# Patient Record
Sex: FEMALE | Race: WHITE | NOT HISPANIC OR LATINO | Employment: FULL TIME | URBAN - NONMETROPOLITAN AREA
[De-identification: names, ages, dates, MRNs, and addresses within clinical notes are randomized per-mention and may not be internally consistent; named-entity substitution may affect disease eponyms.]

---

## 2017-01-13 ENCOUNTER — DOCUMENTATION (OUTPATIENT)
Dept: OBSTETRICS AND GYNECOLOGY | Facility: CLINIC | Age: 37
End: 2017-01-13

## 2017-01-13 NOTE — PROGRESS NOTES
01/13/17  Oneil Silva's office requesting last pap et po notes on hysterectomy. Faxed to [phone].

## 2019-01-17 ENCOUNTER — TRANSCRIBE ORDERS (OUTPATIENT)
Dept: ADMINISTRATIVE | Facility: HOSPITAL | Age: 39
End: 2019-01-17

## 2019-01-17 DIAGNOSIS — Z12.39 BREAST SCREENING: Primary | ICD-10-CM

## 2019-01-25 ENCOUNTER — HOSPITAL ENCOUNTER (OUTPATIENT)
Dept: MAMMOGRAPHY | Facility: HOSPITAL | Age: 39
Discharge: HOME | End: 2019-01-25
Attending: INTERNAL MEDICINE | Admitting: INTERNAL MEDICINE

## 2019-01-25 DIAGNOSIS — Z12.39 BREAST SCREENING: ICD-10-CM

## 2019-01-25 PROCEDURE — 77067 SCR MAMMO BI INCL CAD: CPT

## 2019-01-25 PROCEDURE — 77063 BREAST TOMOSYNTHESIS BI: CPT

## 2019-09-23 ENCOUNTER — OFFICE VISIT (OUTPATIENT)
Dept: CARDIOLOGY | Facility: CLINIC | Age: 39
End: 2019-09-23

## 2019-09-23 VITALS
SYSTOLIC BLOOD PRESSURE: 120 MMHG | OXYGEN SATURATION: 99 % | HEIGHT: 63 IN | DIASTOLIC BLOOD PRESSURE: 60 MMHG | HEART RATE: 68 BPM | WEIGHT: 156 LBS | BODY MASS INDEX: 27.64 KG/M2

## 2019-09-23 DIAGNOSIS — I10 ESSENTIAL HYPERTENSION: ICD-10-CM

## 2019-09-23 DIAGNOSIS — E27.40 ADRENAL INSUFFICIENCY (HCC): Primary | ICD-10-CM

## 2019-09-23 DIAGNOSIS — R94.31 ABNORMAL ECG: ICD-10-CM

## 2019-09-23 DIAGNOSIS — I51.9 ASYMPTOMATIC LV DYSFUNCTION: ICD-10-CM

## 2019-09-23 DIAGNOSIS — B20 HIV (HUMAN IMMUNODEFICIENCY VIRUS INFECTION) (HCC): ICD-10-CM

## 2019-09-23 PROBLEM — Z21 HIV (HUMAN IMMUNODEFICIENCY VIRUS INFECTION): Status: ACTIVE | Noted: 2019-09-23

## 2019-09-23 PROCEDURE — 99204 OFFICE O/P NEW MOD 45 MIN: CPT | Performed by: INTERNAL MEDICINE

## 2019-09-23 PROCEDURE — 93000 ELECTROCARDIOGRAM COMPLETE: CPT | Performed by: INTERNAL MEDICINE

## 2019-09-23 RX ORDER — LOSARTAN POTASSIUM 25 MG/1
25 TABLET ORAL DAILY
COMMUNITY
End: 2019-10-10 | Stop reason: SDUPTHER

## 2019-09-23 RX ORDER — METOPROLOL TARTRATE 50 MG/1
50 TABLET, FILM COATED ORAL 2 TIMES DAILY
COMMUNITY
End: 2019-10-10

## 2019-09-23 RX ORDER — SPIRONOLACTONE 25 MG/1
25 TABLET ORAL DAILY
COMMUNITY
End: 2019-10-10

## 2019-09-23 RX ORDER — ELVITEGRAVIR, COBICISTAT, EMTRICITABINE, AND TENOFOVIR ALAFENAMIDE 150; 150; 200; 10 MG/1; MG/1; MG/1; MG/1
TABLET ORAL
Refills: 1 | COMMUNITY
Start: 2019-08-29

## 2019-09-23 RX ORDER — FAMOTIDINE 20 MG/1
20 TABLET, FILM COATED ORAL 2 TIMES DAILY
COMMUNITY

## 2019-09-23 RX ORDER — ASPIRIN 81 MG/1
81 TABLET ORAL DAILY
COMMUNITY

## 2019-09-23 NOTE — PROGRESS NOTES
Vannesa Torres  [id number]  1980  39 y.o.  female    Referring Provider: Provider, No Known    Reason for  Visit:  Initial visit for severe  left ventricular dysfunction  Recent admission in [facility] after taking Percocet  Smoker  ETOH use        Subjective    Overall feeling well   No chest pain or shortness of breath     No palpitations  No significant pedal edema    Compliant with medications and dietary advice  Good effort tolerance    No presyncope or syncope  Compliant with medications      Excellent effort tolerance with no cardiovascular limitations and at the patient's baseline       History of present illness:  Vannesa Torres is a 39 y.o. yo female with history of HIV  who presents today for   Chief Complaint   Patient presents with   • Rapid Heart Rate     NEW PT    .    History  Past Medical History:   Diagnosis Date   • HIV (human immunodeficiency virus infection) (CMS/[facility])    • Hypertension    ,   Past Surgical History:   Procedure Laterality Date   •  SECTION      x  3   • COLONOSCOPY     • CYSTOSCOPY  2016   • DILATATION AND CURETTAGE     • TOTAL LAPAROSCOPIC HYSTERECTOMY SALPINGO OOPHORECTOMY Left 2016    with bilateral salpingectomy   daVinci assisted   ,   Family History   Problem Relation Age of Onset   • Breast cancer Paternal Grandmother    • Adrenal disorder Neg Hx    ,   Social History     Tobacco Use   • Smoking status: Current Every Day Smoker     Types: Cigarettes   • Smokeless tobacco: Never Used   Substance Use Topics   • Alcohol use: Yes     Comment: occasionally   • Drug use: No   ,     Medications  Current Outpatient Medications   Medication Sig Dispense Refill   • aspirin 81 MG EC tablet Take 81 mg by mouth Daily.     • citalopram (CeleXA) 40 MG tablet TAKE 1 TABLET BY MOUTH DAILY (CELEXA)]  2   • famotidine (PEPCID) 20 MG tablet Take 20 mg by mouth 2 (Two) Times a Day.     • GENVOYA [phone] MG per tablet TAKE 1 TABLET BY MOUTH  "DAILY (take 2 hours before or 2 hours after Calcium, Antacids, or Multi-vitamins)  1   • losartan (COZAAR) 25 MG tablet Take 25 mg by mouth Daily.     • metoprolol tartrate (LOPRESSOR) 50 MG tablet Take 50 mg by mouth 2 (Two) Times a Day.     • spironolactone (ALDACTONE) 25 MG tablet Take 25 mg by mouth Daily.     • traZODone (DESYREL) 100 MG tablet TAKE 1 TABLET BY MOUTH nightly  2   • lisinopril (PRINIVIL,ZESTRIL) 10 MG tablet      • NORVIR 100 MG tablet TAKE 1 TABLET BY MOUTH DAILY  2   • PREZISTA 800 MG tablet tablet TAKE 1 TABLET BY MOUTH DAILY  2   • TRUVADA 200-300 MG per tablet TAKE 1 TABLET BY MOUTH DAILY  2     No current facility-administered medications for this visit.        Allergies:  Patient has no known allergies.    Review of Systems  Review of Systems   Constitution: Negative.   HENT: Negative.    Eyes: Negative.    Cardiovascular: Negative for chest pain, claudication, cyanosis, dyspnea on exertion, irregular heartbeat, leg swelling, near-syncope, orthopnea, palpitations, paroxysmal nocturnal dyspnea and syncope.   Respiratory: Negative.    Endocrine: Negative.    Hematologic/Lymphatic: Negative.    Skin: Negative.    Gastrointestinal: Negative for anorexia.   Genitourinary: Negative.    Neurological: Negative.    Psychiatric/Behavioral: Negative.        Objective     Physical Exam:  /60   Pulse 68   Ht 160 cm (63\")   Wt 70.8 kg (156 lb)   SpO2 99%   BMI 27.63 kg/m²     Physical Exam   Constitutional: She appears well-developed.   HENT:   Head: Normocephalic.   Neck: Normal carotid pulses and no JVD present. No tracheal tenderness present. Carotid bruit is not present. No tracheal deviation and no edema present.   Cardiovascular: Regular rhythm, normal heart sounds and normal pulses.   Pulmonary/Chest: Effort normal. No stridor.   Abdominal: Soft. She exhibits no distension. There is no hepatosplenomegaly. There is no rigidity.   Neurological: She is alert. She has normal strength. No " cranial nerve deficit or sensory deficit.   Skin: Skin is warm.   Psychiatric: She has a normal mood and affect. Her speech is normal and behavior is normal.       Results Review:       ECG 12 Lead  Date/Time: 9/23/2019 9:47 AM  Performed by: Claudio Mcintosh MD  Authorized by: Claudio Mcintosh MD   Comparison: not compared with previous ECG   Rhythm: sinus rhythm  Rate: normal  Conduction: conduction normal  ST Segments: ST segments normal  T inversion: V2, V3, V4, V5, V6, I and aVL  QRS axis: normal    Clinical impression: abnormal EKG            Assessment/Plan   Vannesa was seen today for rapid heart rate.    Diagnoses and all orders for this visit:    Adrenal insufficiency (CMS/HCC)    Essential hypertension    HIV (human immunodeficiency virus infection) (CMS/HCC)    Asymptomatic LV dysfunction    Abnormal ECG  -     Adult Transthoracic Echo Complete W/ Cont if Necessary Per Protocol; Future    Other orders  -     ECG 12 Lead           Plan    Orders Placed This Encounter   Procedures   • ECG 12 Lead     This order was created via procedure documentation   • Adult Transthoracic Echo Complete W/ Cont if Necessary Per Protocol     Standing Status:   Future     Standing Expiration Date:   9/22/2020     Order Specific Question:   Reason for exam?     Answer:   Heart Failure, Cardiomyopathy, or Sytemic or Pulmonary Hypertension      Continue Lifevest that she already has      After I see her may need cardiac cath      ____________________________________________________________________________________________________________________________________________  Health maintenance and recommendations      Low salt/ HTN/ Heart healthy carbohydrate restricted cardiac diet   The patient is advised to reduce or avoid caffeine or other cardiac stimulants.     Minimize or avoid  NSAID-type medications        Monitor for any signs of bleeding including red or dark stools. Fall precautions.        Advised staying uptodate with  immunizations per established standard guidelines.      Offered to give patient  a copy of my notes       Questions were encouraged, asked and answered to the patient's  understanding and satisfaction. Questions if any regarding current medications and side effects, need for refills and importance of compliance to medications stressed.    Reviewed available prior notes, consults, prior visits, laboratory findings, radiology and cardiology relevant reports. Updated chart as applicable. I have reviewed the patient's medical history in detail and updated the computerized patient record as relevant.      Updated patient regarding any new or relevant abnormalities on review of records or any new findings on physical exam. Mentioned to patient about purpose of visit and desirable health short and long term goals and objectives.    Primary to monitor CBC CMP Lipid panel and TSH as applicable    ___________________________________________________________________________________________________________________________________________          Return in about 2 weeks (around 10/7/2019).

## 2019-10-02 ENCOUNTER — HOSPITAL ENCOUNTER (OUTPATIENT)
Dept: CARDIOLOGY | Facility: HOSPITAL | Age: 39
Discharge: HOME | End: 2019-10-02
Admitting: INTERNAL MEDICINE

## 2019-10-02 VITALS
WEIGHT: 156.09 LBS | SYSTOLIC BLOOD PRESSURE: 120 MMHG | HEIGHT: 63 IN | DIASTOLIC BLOOD PRESSURE: 60 MMHG | BODY MASS INDEX: 27.66 KG/M2

## 2019-10-02 DIAGNOSIS — R94.31 ABNORMAL ECG: ICD-10-CM

## 2019-10-02 PROCEDURE — 93306 TTE W/DOPPLER COMPLETE: CPT

## 2019-10-02 PROCEDURE — 93306 TTE W/DOPPLER COMPLETE: CPT | Performed by: INTERNAL MEDICINE

## 2019-10-04 LAB
BH CV ECHO MEAS - AO MAX PG (FULL): 7.2 MMHG
BH CV ECHO MEAS - AO MAX PG: 9.2 MMHG
BH CV ECHO MEAS - AO MEAN PG (FULL): 6 MMHG
BH CV ECHO MEAS - AO MEAN PG: 7 MMHG
BH CV ECHO MEAS - AO ROOT AREA (BSA CORRECTED): 1.6
BH CV ECHO MEAS - AO ROOT AREA: 6.2 CM^2
BH CV ECHO MEAS - AO ROOT DIAM: 2.8 CM
BH CV ECHO MEAS - AO V2 MAX: 152 CM/SEC
BH CV ECHO MEAS - AO V2 MEAN: 121 CM/SEC
BH CV ECHO MEAS - AO V2 VTI: 37.8 CM
BH CV ECHO MEAS - AVA(I,A): 1.5 CM^2
BH CV ECHO MEAS - AVA(I,D): 1.5 CM^2
BH CV ECHO MEAS - AVA(V,A): 1.6 CM^2
BH CV ECHO MEAS - AVA(V,D): 1.6 CM^2
BH CV ECHO MEAS - BSA(HAYCOCK): 1.8 M^2
BH CV ECHO MEAS - BSA: 1.7 M^2
BH CV ECHO MEAS - BZI_BMI: 28.5 KILOGRAMS/M^2
BH CV ECHO MEAS - BZI_METRIC_HEIGHT: 157.5 CM
BH CV ECHO MEAS - BZI_METRIC_WEIGHT: 70.8 KG
BH CV ECHO MEAS - EDV(CUBED): 165.5 ML
BH CV ECHO MEAS - EDV(MOD-SP4): 76.9 ML
BH CV ECHO MEAS - EDV(TEICH): 146.8 ML
BH CV ECHO MEAS - EF(CUBED): 72.7 %
BH CV ECHO MEAS - EF(MOD-SP4): 64.4 %
BH CV ECHO MEAS - EF(TEICH): 63.9 %
BH CV ECHO MEAS - ESV(CUBED): 45.1 ML
BH CV ECHO MEAS - ESV(MOD-SP4): 27.4 ML
BH CV ECHO MEAS - ESV(TEICH): 53 ML
BH CV ECHO MEAS - FS: 35.2 %
BH CV ECHO MEAS - IVS/LVPW: 0.84
BH CV ECHO MEAS - IVSD: 1 CM
BH CV ECHO MEAS - LA DIMENSION: 3.7 CM
BH CV ECHO MEAS - LA/AO: 1.3
BH CV ECHO MEAS - LAT PEAK E' VEL: 10.6 CM/SEC
BH CV ECHO MEAS - LV DIASTOLIC VOL/BSA (35-75): 44.7 ML/M^2
BH CV ECHO MEAS - LV MASS(C)D: 242.8 GRAMS
BH CV ECHO MEAS - LV MASS(C)DI: 141.1 GRAMS/M^2
BH CV ECHO MEAS - LV MAX PG: 2 MMHG
BH CV ECHO MEAS - LV MEAN PG: 1 MMHG
BH CV ECHO MEAS - LV SYSTOLIC VOL/BSA (12-30): 15.9 ML/M^2
BH CV ECHO MEAS - LV V1 MAX: 70.9 CM/SEC
BH CV ECHO MEAS - LV V1 MEAN: 53.9 CM/SEC
BH CV ECHO MEAS - LV V1 VTI: 16.4 CM
BH CV ECHO MEAS - LVIDD: 5.5 CM
BH CV ECHO MEAS - LVIDS: 3.6 CM
BH CV ECHO MEAS - LVLD AP4: 8.1 CM
BH CV ECHO MEAS - LVLS AP4: 7 CM
BH CV ECHO MEAS - LVOT AREA (M): 3.5 CM^2
BH CV ECHO MEAS - LVOT AREA: 3.5 CM^2
BH CV ECHO MEAS - LVOT DIAM: 2.1 CM
BH CV ECHO MEAS - LVPWD: 1.2 CM
BH CV ECHO MEAS - MED PEAK E' VEL: 10.7 CM/SEC
BH CV ECHO MEAS - MV A MAX VEL: 55.8 CM/SEC
BH CV ECHO MEAS - MV DEC SLOPE: 268 CM/SEC^2
BH CV ECHO MEAS - MV DEC TIME: 0.35 SEC
BH CV ECHO MEAS - MV E MAX VEL: 92.6 CM/SEC
BH CV ECHO MEAS - MV E/A: 1.7
BH CV ECHO MEAS - SI(AO): 135.3 ML/M^2
BH CV ECHO MEAS - SI(CUBED): 70 ML/M^2
BH CV ECHO MEAS - SI(LVOT): 33 ML/M^2
BH CV ECHO MEAS - SI(MOD-SP4): 28.8 ML/M^2
BH CV ECHO MEAS - SI(TEICH): 54.5 ML/M^2
BH CV ECHO MEAS - SV(AO): 232.8 ML
BH CV ECHO MEAS - SV(CUBED): 120.4 ML
BH CV ECHO MEAS - SV(LVOT): 56.8 ML
BH CV ECHO MEAS - SV(MOD-SP4): 49.5 ML
BH CV ECHO MEAS - SV(TEICH): 93.8 ML
BH CV ECHO MEAS - TR MAX VEL: 228 CM/SEC
BH CV ECHO MEASUREMENTS AVERAGE E/E' RATIO: 8.69
LEFT ATRIUM VOLUME INDEX: 20.5 ML/M2
LEFT ATRIUM VOLUME: 35.3 CM3
LV EF 2D ECHO EST: 60 %
MAXIMAL PREDICTED HEART RATE: 183 BPM
STRESS TARGET HR: 156 BPM

## 2019-10-10 ENCOUNTER — OFFICE VISIT (OUTPATIENT)
Dept: CARDIOLOGY | Facility: CLINIC | Age: 39
End: 2019-10-10

## 2019-10-10 VITALS
DIASTOLIC BLOOD PRESSURE: 65 MMHG | HEIGHT: 62 IN | BODY MASS INDEX: 29.26 KG/M2 | WEIGHT: 159 LBS | OXYGEN SATURATION: 98 % | HEART RATE: 73 BPM | SYSTOLIC BLOOD PRESSURE: 100 MMHG

## 2019-10-10 DIAGNOSIS — I51.9 ASYMPTOMATIC LV DYSFUNCTION: ICD-10-CM

## 2019-10-10 DIAGNOSIS — I15.2 ENDOCRINE HYPERTENSION: ICD-10-CM

## 2019-10-10 DIAGNOSIS — R94.31 ABNORMAL ECG: ICD-10-CM

## 2019-10-10 DIAGNOSIS — B20 HIV INFECTION, UNSPECIFIED SYMPTOM STATUS (HCC): ICD-10-CM

## 2019-10-10 DIAGNOSIS — I10 ESSENTIAL HYPERTENSION: Primary | ICD-10-CM

## 2019-10-10 DIAGNOSIS — E89.41 HOT FLASHES DUE TO SURGICAL MENOPAUSE: ICD-10-CM

## 2019-10-10 DIAGNOSIS — E27.40 ADRENAL INSUFFICIENCY (HCC): ICD-10-CM

## 2019-10-10 PROCEDURE — 99214 OFFICE O/P EST MOD 30 MIN: CPT | Performed by: INTERNAL MEDICINE

## 2019-10-10 RX ORDER — METOPROLOL SUCCINATE 25 MG/1
25 TABLET, EXTENDED RELEASE ORAL DAILY
Qty: 90 TABLET | Refills: 3 | Status: SHIPPED | OUTPATIENT
Start: 2019-10-10 | End: 2020-10-23

## 2019-10-10 RX ORDER — LOSARTAN POTASSIUM 25 MG/1
25 TABLET ORAL DAILY
Qty: 90 TABLET | Refills: 3 | Status: SHIPPED | OUTPATIENT
Start: 2019-10-10 | End: 2020-10-23

## 2019-10-10 RX ORDER — METOPROLOL SUCCINATE 50 MG/1
50 TABLET, EXTENDED RELEASE ORAL DAILY
COMMUNITY
End: 2019-10-10 | Stop reason: SDUPTHER

## 2019-10-10 NOTE — PROGRESS NOTES
Vannesa Torres  [id number]  1980  39 y.o.  female    Referring Provider: Provider, No Known    Reason for  Visit:  Here for follow up after cardiac testing after initial   visit for severe  left ventricular dysfunction  Recent admission in [facility] after taking Percocet  Smoker  ETOH use  Cardiac workup test results as below   Repeat echo shows normal LVEF  Not doing any drugs    Subjective      Feels  better overall  No new events or complaints since last visit   Overall the patient feels no major change from baseline symptoms   Similar symptoms as during last visit      Overall feeling well   No chest pain or shortness of breath     No palpitations  No significant pedal edema    Compliant with medications and dietary advice  Good effort tolerance    No presyncope or syncope  Compliant with medications      Excellent effort tolerance with no cardiovascular limitations and at the patient's baseline   Can walk miles easily      History of present illness:  Vannesa Torres is a 39 y.o. yo female with history of HIV  who presents today for   Chief Complaint   Patient presents with   • Rapid Heart Rate     1 mo f/u - recent echo   .    History  Past Medical History:   Diagnosis Date   • HIV (human immunodeficiency virus infection) (CMS/[facility])    • Hypertension    ,   Past Surgical History:   Procedure Laterality Date   •  SECTION      x  3   • COLONOSCOPY     • CYSTOSCOPY  2016   • DILATATION AND CURETTAGE     • TOTAL LAPAROSCOPIC HYSTERECTOMY SALPINGO OOPHORECTOMY Left 2016    with bilateral salpingectomy   daVinci assisted   ,   Family History   Problem Relation Age of Onset   • No Known Problems Mother    • No Known Problems Father    ,   Social History     Tobacco Use   • Smoking status: Current Every Day Smoker     Packs/day: 0.25     Types: Cigarettes   • Smokeless tobacco: Never Used   Substance Use Topics   • Alcohol use: Yes     Comment: occasionally   • Drug  "use: No   ,     Medications  Current Outpatient Medications   Medication Sig Dispense Refill   • aspirin 81 MG EC tablet Take 81 mg by mouth Daily.     • citalopram (CeleXA) 40 MG tablet TAKE 1 TABLET BY MOUTH DAILY (CELEXA)]  2   • famotidine (PEPCID) 20 MG tablet Take 20 mg by mouth 2 (Two) Times a Day.     • GENVOYA [phone] MG per tablet TAKE 1 TABLET BY MOUTH DAILY (take 2 hours before or 2 hours after Calcium, Antacids, or Multi-vitamins)  1   • losartan (COZAAR) 25 MG tablet Take 1 tablet by mouth Daily. 90 tablet 3   • metoprolol succinate XL (TOPROL-XL) 25 MG 24 hr tablet Take 1 tablet by mouth Daily. 90 tablet 3   • NORVIR 100 MG tablet TAKE 1 TABLET BY MOUTH DAILY  2   • PREZISTA 800 MG tablet tablet TAKE 1 TABLET BY MOUTH DAILY  2   • traZODone (DESYREL) 100 MG tablet TAKE 1 TABLET BY MOUTH nightly  2   • TRUVADA 200-300 MG per tablet TAKE 1 TABLET BY MOUTH DAILY  2     No current facility-administered medications for this visit.        Allergies:  Patient has no known allergies.    Review of Systems  Review of Systems   Constitution: Positive for malaise/fatigue.   HENT: Negative.    Eyes: Negative.    Cardiovascular: Negative for chest pain, claudication, cyanosis, dyspnea on exertion, irregular heartbeat, leg swelling, near-syncope, orthopnea, palpitations, paroxysmal nocturnal dyspnea and syncope.   Respiratory: Negative.    Endocrine: Negative.    Hematologic/Lymphatic: Negative.    Skin: Negative.    Gastrointestinal: Negative for anorexia.   Genitourinary: Negative.    Neurological: Negative.    Psychiatric/Behavioral: Negative.        Objective     Physical Exam:  /65   Pulse 73   Ht 157.5 cm (62\")   Wt 72.1 kg (159 lb)   SpO2 98%   BMI 29.08 kg/m²     Physical Exam   Constitutional: She appears well-developed.   HENT:   Head: Normocephalic.   Neck: Normal carotid pulses and no JVD present. No tracheal tenderness present. Carotid bruit is not present. No tracheal deviation and no " edema present.   Cardiovascular: Regular rhythm, normal heart sounds and normal pulses.   Pulmonary/Chest: Effort normal. No stridor.   Abdominal: Soft. She exhibits no distension. There is no hepatosplenomegaly. There is no rigidity.   Neurological: She is alert. She has normal strength. No cranial nerve deficit or sensory deficit.   Skin: Skin is warm.   Psychiatric: She has a normal mood and affect. Her speech is normal and behavior is normal.       Results Review:      Results for orders placed during the hospital encounter of 10/02/19   Adult Transthoracic Echo Complete W/ Cont if Necessary Per Protocol    Narrative · Estimated EF = 60%.  · Left ventricular systolic function is normal.  · No evidence of pulmonary hypertension is present.  · Normal study            Procedures    Assessment/Plan   Vannesa was seen today for rapid heart rate.    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  -     metoprolol succinate XL (TOPROL-XL) 25 MG 24 hr tablet; Take 1 tablet by mouth Daily.           Plan       Discontinue Lifevest that she already has  The current medical regimen is effective;  continue present plan and medications.   Conservative medical therapy per patient. Risks, benefits and alternatives explained. The patient understands and wishes to proceed with only conservative therapy.  The patient expressively declined any invasive testing or treatment     Monitor LVEF by serial echo  Avoid all cardiotoxic agents    Requested Prescriptions     Signed Prescriptions Disp Refills   • losartan (COZAAR) 25 MG tablet 90 tablet 3     Sig: Take 1 tablet by mouth Daily.   • metoprolol succinate XL  (TOPROL-XL) 25 MG 24 hr tablet 90 tablet 3     Sig: Take 1 tablet by mouth Daily.    OK to stop Aldactone   Decrease beta blocker therapy by 50%  ____________________________________________________________________________________________________________________________________________  Health maintenance and recommendations      Similar recommendations as last visit       Offered to give patient  a copy of my notes       Questions were encouraged, asked and answered to the patient's  understanding and satisfaction. Questions if any regarding current medications and side effects, need for refills and importance of compliance to medications stressed.    Reviewed available prior notes, consults, prior visits, laboratory findings, radiology and cardiology relevant reports. Updated chart as applicable. I have reviewed the patient's medical history in detail and updated the computerized patient record as relevant.      Updated patient regarding any new or relevant abnormalities on review of records or any new findings on physical exam. Mentioned to patient about purpose of visit and desirable health short and long term goals and objectives.    Primary to monitor CBC CMP Lipid panel and TSH as applicable    ___________________________________________________________________________________________________________________________________________          Return in about 6 months (around 4/10/2020).

## 2020-06-26 ENCOUNTER — OFFICE VISIT (OUTPATIENT)
Dept: CARDIOLOGY | Facility: CLINIC | Age: 40
End: 2020-06-26

## 2020-06-26 VITALS
HEIGHT: 63 IN | OXYGEN SATURATION: 97 % | DIASTOLIC BLOOD PRESSURE: 70 MMHG | SYSTOLIC BLOOD PRESSURE: 115 MMHG | WEIGHT: 165 LBS | BODY MASS INDEX: 29.23 KG/M2 | HEART RATE: 82 BPM

## 2020-06-26 DIAGNOSIS — B20 HIV INFECTION, UNSPECIFIED SYMPTOM STATUS (HCC): Primary | ICD-10-CM

## 2020-06-26 DIAGNOSIS — E27.40 ADRENAL INSUFFICIENCY (HCC): ICD-10-CM

## 2020-06-26 DIAGNOSIS — I10 ESSENTIAL HYPERTENSION: ICD-10-CM

## 2020-06-26 DIAGNOSIS — I15.2 ENDOCRINE HYPERTENSION: ICD-10-CM

## 2020-06-26 DIAGNOSIS — R94.31 ABNORMAL ECG: ICD-10-CM

## 2020-06-26 DIAGNOSIS — I51.9 ASYMPTOMATIC LV DYSFUNCTION: ICD-10-CM

## 2020-06-26 DIAGNOSIS — E89.41 HOT FLASHES DUE TO SURGICAL MENOPAUSE: ICD-10-CM

## 2020-06-26 PROCEDURE — 93000 ELECTROCARDIOGRAM COMPLETE: CPT | Performed by: INTERNAL MEDICINE

## 2020-06-26 PROCEDURE — 99214 OFFICE O/P EST MOD 30 MIN: CPT | Performed by: INTERNAL MEDICINE

## 2020-06-26 NOTE — PROGRESS NOTES
Vannesa Torres  [id number]  1980  40 y.o.  female    Referring Provider: Provider, No Known    Reason for  Visit:  Here for follow up after cardiac testing after initial   visit for severe  left ventricular dysfunction  Prior  admission in [facility] after taking Percocet  Smoker  ETOH use  Cardiac workup test results as below   Repeat echo shows normal LVEF  Not doing any drugs    Subjective    Overall feels well    No new events or complaints since last visit   Overall the patient feels no major change from baseline symptoms   Similar symptoms as during last visit     Tolerating current medications well with no untoward side effects   Compliant with prescribed medication regimen. Tries to adhere to cardiac diet.        Overall feeling well   No chest pain or shortness of breath     No palpitations  No significant pedal edema    Compliant with medications and dietary advice  Good effort tolerance    No presyncope or syncope  Compliant with medications      Excellent effort tolerance with no cardiovascular limitations and at the patient's baseline   Can walk miles easily      History of present illness:  Vannesa Torres is a 40 y.o. yo female with history of HIV  who presents today for   Chief Complaint   Patient presents with   • Rapid Heart Rate     6 MON FU    .    History  Past Medical History:   Diagnosis Date   • HIV (human immunodeficiency virus infection) (CMS/[facility])    • Hypertension    ,   Past Surgical History:   Procedure Laterality Date   •  SECTION      x  3   • COLONOSCOPY     • CYSTOSCOPY  2016   • DILATATION AND CURETTAGE     • TOTAL LAPAROSCOPIC HYSTERECTOMY SALPINGO OOPHORECTOMY Left 2016    with bilateral salpingectomy   daVinci assisted   ,   History reviewed. No pertinent family history.,   Social History     Tobacco Use   • Smoking status: Current Every Day Smoker     Packs/day: 0.25     Types: Cigarettes   • Smokeless tobacco: Never Used  "  Substance Use Topics   • Alcohol use: Yes     Comment: occasionally   • Drug use: No   ,     Medications  Current Outpatient Medications   Medication Sig Dispense Refill   • aspirin 81 MG EC tablet Take 81 mg by mouth Daily.     • citalopram (CeleXA) 40 MG tablet TAKE 1 TABLET BY MOUTH DAILY (CELEXA)]  2   • famotidine (PEPCID) 20 MG tablet Take 20 mg by mouth 2 (Two) Times a Day.     • GENVOYA [phone] MG per tablet TAKE 1 TABLET BY MOUTH DAILY (take 2 hours before or 2 hours after Calcium, Antacids, or Multi-vitamins)  1   • losartan (COZAAR) 25 MG tablet Take 1 tablet by mouth Daily. 90 tablet 3   • metoprolol succinate XL (TOPROL-XL) 25 MG 24 hr tablet Take 1 tablet by mouth Daily. 90 tablet 3   • NORVIR 100 MG tablet TAKE 1 TABLET BY MOUTH DAILY  2   • PREZISTA 800 MG tablet tablet TAKE 1 TABLET BY MOUTH DAILY  2   • traZODone (DESYREL) 100 MG tablet TAKE 1 TABLET BY MOUTH nightly  2   • TRUVADA 200-300 MG per tablet TAKE 1 TABLET BY MOUTH DAILY  2     No current facility-administered medications for this visit.        Allergies:  Patient has no known allergies.    Review of Systems  Review of Systems   Constitution: Positive for malaise/fatigue.   HENT: Negative.    Eyes: Negative.    Cardiovascular: Negative for chest pain, claudication, cyanosis, dyspnea on exertion, irregular heartbeat, leg swelling, near-syncope, orthopnea, palpitations, paroxysmal nocturnal dyspnea and syncope.   Respiratory: Negative.    Endocrine: Negative.    Hematologic/Lymphatic: Negative.    Skin: Negative.    Gastrointestinal: Negative for anorexia.   Genitourinary: Negative.    Neurological: Negative.    Psychiatric/Behavioral: Negative.        Objective     Physical Exam:  /70   Pulse 82   Ht 160 cm (63\")   Wt 74.8 kg (165 lb)   SpO2 97%   BMI 29.23 kg/m²     Physical Exam   Constitutional: She appears well-developed.   HENT:   Head: Normocephalic.   Neck: Normal carotid pulses and no JVD present. No tracheal " tenderness present. Carotid bruit is not present. No tracheal deviation and no edema present.   Cardiovascular: Regular rhythm, normal heart sounds and normal pulses.   Pulmonary/Chest: Effort normal. No stridor.   Abdominal: Soft. She exhibits no distension. There is no hepatosplenomegaly. There is no rigidity.   Neurological: She is alert. She has normal strength. No cranial nerve deficit or sensory deficit.   Skin: Skin is warm.   Psychiatric: She has a normal mood and affect. Her speech is normal and behavior is normal.       Results Review:      Results for orders placed during the hospital encounter of 10/02/19   Adult Transthoracic Echo Complete W/ Cont if Necessary Per Protocol    Narrative · Estimated EF = 60%.  · Left ventricular systolic function is normal.  · No evidence of pulmonary hypertension is present.  · Normal study              ECG 12 Lead  Date/Time: 6/26/2020 1:08 PM  Performed by: Claudio Mcintosh MD  Authorized by: Claudio Mcintosh MD   Comparison: compared with previous ECG from 9/23/2019  Similar to previous ECG  Rhythm: sinus rhythm  Rate: normal  Q waves: V1, V2 and V3    QRS axis: normal    Clinical impression: abnormal EKG            Assessment/Plan   Vannesa was seen today for rapid heart rate.    Diagnoses and all orders for this visit:    HIV infection, unspecified symptom status (CMS/HCC)    Hot flashes due to surgical menopause    Adrenal insufficiency (CMS/HCC)    Abnormal ECG  -     Adult Transthoracic Echo Complete W/ Cont if Necessary Per Protocol; Future    Asymptomatic LV dysfunction lvef 30%    Essential hypertension    Endocrine hypertension    Other orders  -     ECG 12 Lead           ___________________________________________________________________________________________________  Health maintenance and recommendations      Similar recommendations as last visit       Offered to give patient  a copy of my notes       Questions were encouraged, asked and answered to the  patient's  understanding and satisfaction. Questions if any regarding current medications and side effects, need for refills and importance of compliance to medications stressed.    Reviewed available prior notes, consults, prior visits, laboratory findings, radiology and cardiology relevant reports. Updated chart as applicable. I have reviewed the patient's medical history in detail and updated the computerized patient record as relevant.      Updated patient regarding any new or relevant abnormalities on review of records or any new findings on physical exam. Mentioned to patient about purpose of visit and desirable health short and long term goals and objectives.    Primary to monitor CBC CMP Lipid panel and TSH as applicable    ___________________________________________________________________________________________________________________________________________    Plan       The current medical regimen is effective;  continue present plan and medications.     Orders Placed This Encounter   Procedures   • ECG 12 Lead     This order was created via procedure documentation   • Adult Transthoracic Echo Complete W/ Cont if Necessary Per Protocol     Standing Status:   Future     Standing Expiration Date:   6/26/2021     Order Specific Question:   Reason for exam?     Answer:   Heart Failure, Cardiomyopathy, or Sytemic or Pulmonary Hypertension       May need coronary CT in future as has cardiomyopathy but has recovered           Return in about 6 months (around 12/26/2020).

## 2020-10-23 ENCOUNTER — HOSPITAL ENCOUNTER (OUTPATIENT)
Dept: CARDIOLOGY | Facility: HOSPITAL | Age: 40
Discharge: HOME | End: 2020-10-23
Admitting: INTERNAL MEDICINE

## 2020-10-23 VITALS
BODY MASS INDEX: 27.64 KG/M2 | WEIGHT: 156 LBS | HEIGHT: 63 IN | SYSTOLIC BLOOD PRESSURE: 116 MMHG | DIASTOLIC BLOOD PRESSURE: 68 MMHG

## 2020-10-23 DIAGNOSIS — R94.31 ABNORMAL ECG: ICD-10-CM

## 2020-10-23 PROCEDURE — 93306 TTE W/DOPPLER COMPLETE: CPT

## 2020-10-23 PROCEDURE — 93306 TTE W/DOPPLER COMPLETE: CPT | Performed by: INTERNAL MEDICINE

## 2020-10-23 RX ORDER — LOSARTAN POTASSIUM 25 MG/1
25 TABLET ORAL DAILY
Qty: 90 TABLET | Refills: 3 | Status: SHIPPED | OUTPATIENT
Start: 2020-10-23

## 2020-10-23 RX ORDER — METOPROLOL SUCCINATE 25 MG/1
25 TABLET, EXTENDED RELEASE ORAL DAILY
Qty: 90 TABLET | Refills: 3 | Status: SHIPPED | OUTPATIENT
Start: 2020-10-23

## 2020-10-25 LAB
BH CV ECHO MEAS - AO MAX PG (FULL): 1.8 MMHG
BH CV ECHO MEAS - AO MAX PG: 4.8 MMHG
BH CV ECHO MEAS - AO MEAN PG (FULL): 1 MMHG
BH CV ECHO MEAS - AO MEAN PG: 3 MMHG
BH CV ECHO MEAS - AO ROOT AREA (BSA CORRECTED): 1.7
BH CV ECHO MEAS - AO ROOT AREA: 7.1 CM^2
BH CV ECHO MEAS - AO ROOT DIAM: 3 CM
BH CV ECHO MEAS - AO V2 MAX: 110 CM/SEC
BH CV ECHO MEAS - AO V2 MEAN: 84 CM/SEC
BH CV ECHO MEAS - AO V2 VTI: 24 CM
BH CV ECHO MEAS - AVA(I,A): 2.4 CM^2
BH CV ECHO MEAS - AVA(I,D): 2.4 CM^2
BH CV ECHO MEAS - AVA(V,A): 2.7 CM^2
BH CV ECHO MEAS - AVA(V,D): 2.7 CM^2
BH CV ECHO MEAS - BSA(HAYCOCK): 1.8 M^2
BH CV ECHO MEAS - BSA: 1.7 M^2
BH CV ECHO MEAS - BZI_BMI: 27.6 KILOGRAMS/M^2
BH CV ECHO MEAS - BZI_METRIC_HEIGHT: 160 CM
BH CV ECHO MEAS - BZI_METRIC_WEIGHT: 70.8 KG
BH CV ECHO MEAS - EDV(CUBED): 136.6 ML
BH CV ECHO MEAS - EDV(MOD-SP4): 73.4 ML
BH CV ECHO MEAS - EDV(TEICH): 126.6 ML
BH CV ECHO MEAS - EF(CUBED): 72.2 %
BH CV ECHO MEAS - EF(MOD-SP4): 59.3 %
BH CV ECHO MEAS - EF(TEICH): 63.6 %
BH CV ECHO MEAS - ESV(CUBED): 37.9 ML
BH CV ECHO MEAS - ESV(MOD-SP4): 29.9 ML
BH CV ECHO MEAS - ESV(TEICH): 46.1 ML
BH CV ECHO MEAS - FS: 34.8 %
BH CV ECHO MEAS - IVS/LVPW: 1.2
BH CV ECHO MEAS - IVSD: 0.8 CM
BH CV ECHO MEAS - LA DIMENSION: 3.3 CM
BH CV ECHO MEAS - LA/AO: 1.1
BH CV ECHO MEAS - LAT PEAK E' VEL: 10.9 CM/SEC
BH CV ECHO MEAS - LV DIASTOLIC VOL/BSA (35-75): 42.2 ML/M^2
BH CV ECHO MEAS - LV MASS(C)D: 130.1 GRAMS
BH CV ECHO MEAS - LV MASS(C)DI: 74.8 GRAMS/M^2
BH CV ECHO MEAS - LV MAX PG: 3 MMHG
BH CV ECHO MEAS - LV MEAN PG: 2 MMHG
BH CV ECHO MEAS - LV SYSTOLIC VOL/BSA (12-30): 17.2 ML/M^2
BH CV ECHO MEAS - LV V1 MAX: 86.5 CM/SEC
BH CV ECHO MEAS - LV V1 MEAN: 60.6 CM/SEC
BH CV ECHO MEAS - LV V1 VTI: 16.6 CM
BH CV ECHO MEAS - LVIDD: 5.2 CM
BH CV ECHO MEAS - LVIDS: 3.4 CM
BH CV ECHO MEAS - LVLD AP4: 7.6 CM
BH CV ECHO MEAS - LVLS AP4: 6.4 CM
BH CV ECHO MEAS - LVOT AREA (M): 3.5 CM^2
BH CV ECHO MEAS - LVOT AREA: 3.5 CM^2
BH CV ECHO MEAS - LVOT DIAM: 2.1 CM
BH CV ECHO MEAS - LVPWD: 0.69 CM
BH CV ECHO MEAS - MED PEAK E' VEL: 9.36 CM/SEC
BH CV ECHO MEAS - MV A MAX VEL: 43.7 CM/SEC
BH CV ECHO MEAS - MV DEC SLOPE: 208 CM/SEC^2
BH CV ECHO MEAS - MV DEC TIME: 0.28 SEC
BH CV ECHO MEAS - MV E MAX VEL: 66.5 CM/SEC
BH CV ECHO MEAS - MV E/A: 1.5
BH CV ECHO MEAS - MV MAX PG: 2.6 MMHG
BH CV ECHO MEAS - MV MEAN PG: 1 MMHG
BH CV ECHO MEAS - MV P1/2T MAX VEL: 84.4 CM/SEC
BH CV ECHO MEAS - MV P1/2T: 118.8 MSEC
BH CV ECHO MEAS - MV V2 MAX: 80.8 CM/SEC
BH CV ECHO MEAS - MV V2 MEAN: 58 CM/SEC
BH CV ECHO MEAS - MV V2 VTI: 25.6 CM
BH CV ECHO MEAS - MVA P1/2T LCG: 2.6 CM^2
BH CV ECHO MEAS - MVA(P1/2T): 1.9 CM^2
BH CV ECHO MEAS - MVA(VTI): 2.2 CM^2
BH CV ECHO MEAS - PA MAX PG: 1.4 MMHG
BH CV ECHO MEAS - PA V2 MAX: 59.7 CM/SEC
BH CV ECHO MEAS - RAP SYSTOLE: 5 MMHG
BH CV ECHO MEAS - RVSP: 22.6 MMHG
BH CV ECHO MEAS - SI(AO): 97.5 ML/M^2
BH CV ECHO MEAS - SI(CUBED): 56.7 ML/M^2
BH CV ECHO MEAS - SI(LVOT): 33 ML/M^2
BH CV ECHO MEAS - SI(MOD-SP4): 25 ML/M^2
BH CV ECHO MEAS - SI(TEICH): 46.3 ML/M^2
BH CV ECHO MEAS - SV(AO): 169.6 ML
BH CV ECHO MEAS - SV(CUBED): 98.7 ML
BH CV ECHO MEAS - SV(LVOT): 57.5 ML
BH CV ECHO MEAS - SV(MOD-SP4): 43.5 ML
BH CV ECHO MEAS - SV(TEICH): 80.5 ML
BH CV ECHO MEAS - TR MAX VEL: 210 CM/SEC
BH CV ECHO MEASUREMENTS AVERAGE E/E' RATIO: 6.56
LEFT ATRIUM VOLUME INDEX: 22.5 ML/M2
LEFT ATRIUM VOLUME: 39.2 CM3
MAXIMAL PREDICTED HEART RATE: 182 BPM
STRESS TARGET HR: 155 BPM

## 2021-01-29 ENCOUNTER — OFFICE VISIT (OUTPATIENT)
Dept: CARDIOLOGY | Facility: CLINIC | Age: 41
End: 2021-01-29

## 2021-01-29 VITALS
HEART RATE: 64 BPM | SYSTOLIC BLOOD PRESSURE: 108 MMHG | WEIGHT: 160 LBS | OXYGEN SATURATION: 97 % | DIASTOLIC BLOOD PRESSURE: 58 MMHG | HEIGHT: 63 IN | BODY MASS INDEX: 28.35 KG/M2

## 2021-01-29 DIAGNOSIS — R94.31 ABNORMAL ECG: ICD-10-CM

## 2021-01-29 DIAGNOSIS — I15.2 ENDOCRINE HYPERTENSION: ICD-10-CM

## 2021-01-29 DIAGNOSIS — I10 ESSENTIAL HYPERTENSION: ICD-10-CM

## 2021-01-29 DIAGNOSIS — I51.9 ASYMPTOMATIC LV DYSFUNCTION: ICD-10-CM

## 2021-01-29 DIAGNOSIS — E89.41 HOT FLASHES DUE TO SURGICAL MENOPAUSE: ICD-10-CM

## 2021-01-29 DIAGNOSIS — E27.40 ADRENAL INSUFFICIENCY (HCC): ICD-10-CM

## 2021-01-29 DIAGNOSIS — B20 HIV INFECTION, UNSPECIFIED SYMPTOM STATUS (HCC): Primary | ICD-10-CM

## 2021-01-29 PROCEDURE — 99213 OFFICE O/P EST LOW 20 MIN: CPT | Performed by: INTERNAL MEDICINE

## 2021-01-29 NOTE — PROGRESS NOTES
Vannesa Torres  [id number]  1980  40 y.o.  female    Referring Provider: Provider, No Known    Reason for  Visit:  Here for follow up after cardiac testing after initial   visit for severe  left ventricular dysfunction  Prior  admission in [facility] after taking Percocet  Smoker  ETOH use  Cardiac workup test results as below   Repeat echo shows normal LVEF  Not doing any drugs      Subjective    Overall feeling well   No chest pain or shortness of breath     No palpitations  No significant pedal edema    Compliant with medications and dietary advice  Good effort tolerance    No presyncope or syncope  Compliant with medications    Tolerating current medications well with no untoward side effects   Compliant with prescribed medication regimen. Tries to adhere to cardiac diet.      Excellent effort tolerance with no cardiovascular limitations and at the patient's baseline   No new events or complaints since last visit         History of present illness:  Vannesa Torres is a 40 y.o. yo female with history of HIV  who presents today for   Chief Complaint   Patient presents with   • Palpitations     6 wk follow up    • Results     ECHO   .    History  Past Medical History:   Diagnosis Date   • HIV (human immunodeficiency virus infection) (CMS/[facility])    • Hypertension    ,   Past Surgical History:   Procedure Laterality Date   •  SECTION      x  3   • COLONOSCOPY     • CYSTOSCOPY  2016   • DILATATION AND CURETTAGE     • TOTAL LAPAROSCOPIC HYSTERECTOMY SALPINGO OOPHORECTOMY Left 2016    with bilateral salpingectomy   daVinci assisted   ,   History reviewed. No pertinent family history.,   Social History     Tobacco Use   • Smoking status: Current Every Day Smoker     Packs/day: 0.25     Types: Cigarettes   • Smokeless tobacco: Never Used   Substance Use Topics   • Alcohol use: Yes     Comment: occasionally   • Drug use: No   ,     Medications  Current Outpatient  "Medications   Medication Sig Dispense Refill   • aspirin 81 MG EC tablet Take 81 mg by mouth Daily.     • citalopram (CeleXA) 40 MG tablet TAKE 1 TABLET BY MOUTH DAILY (CELEXA)]  2   • famotidine (PEPCID) 20 MG tablet Take 20 mg by mouth 2 (Two) Times a Day.     • GENVOYA [phone] MG per tablet TAKE 1 TABLET BY MOUTH DAILY (take 2 hours before or 2 hours after Calcium, Antacids, or Multi-vitamins)  1   • losartan (COZAAR) 25 MG tablet TAKE 1 TABLET BY MOUTH DAILY 90 tablet 3   • metoprolol succinate XL (TOPROL-XL) 25 MG 24 hr tablet TAKE 1 TABLET BY MOUTH DAILY 90 tablet 3   • NORVIR 100 MG tablet TAKE 1 TABLET BY MOUTH DAILY  2   • PREZISTA 800 MG tablet tablet TAKE 1 TABLET BY MOUTH DAILY  2   • traZODone (DESYREL) 100 MG tablet TAKE 1 TABLET BY MOUTH nightly  2   • TRUVADA 200-300 MG per tablet TAKE 1 TABLET BY MOUTH DAILY  2     No current facility-administered medications for this visit.        Allergies:  Patient has no known allergies.    Review of Systems  Review of Systems   Constitution: Positive for malaise/fatigue.   HENT: Negative.    Eyes: Negative.    Cardiovascular: Negative for chest pain, claudication, cyanosis, dyspnea on exertion, irregular heartbeat, leg swelling, near-syncope, orthopnea, palpitations, paroxysmal nocturnal dyspnea and syncope.   Respiratory: Negative.    Endocrine: Negative.    Hematologic/Lymphatic: Negative.    Skin: Negative.    Gastrointestinal: Negative for anorexia.   Genitourinary: Negative.    Neurological: Negative.    Psychiatric/Behavioral: Negative.        Objective     Physical Exam:  /58   Pulse 64   Ht 160 cm (63\")   Wt 72.6 kg (160 lb)   SpO2 97%   BMI 28.34 kg/m²     Physical Exam   Constitutional: She appears well-developed.   HENT:   Head: Normocephalic.   Neck: Normal carotid pulses and no JVD present. No tracheal tenderness present. Carotid bruit is not present. No tracheal deviation and no edema present.   Cardiovascular: Regular rhythm, " normal heart sounds and normal pulses.   Pulmonary/Chest: Effort normal. No stridor.   Abdominal: Soft. She exhibits no distension. There is no rigidity.   Neurological: She is alert. No cranial nerve deficit or sensory deficit.   Skin: Skin is warm.   Psychiatric: Her speech is normal and behavior is normal.       Results Review:      Results for orders placed during the hospital encounter of 10/23/20   Adult Transthoracic Echo Complete W/ Cont if Necessary Per Protocol    Narrative · Left ventricular ejection fraction appears to be 56 - 60%. Left   ventricular systolic function is normal.  · Left ventricular diastolic function was normal.  · No pulmonary hypertension  · Normal study            Procedures    Assessment/Plan   Diagnoses and all orders for this visit:    1. HIV infection, unspecified symptom status (CMS/HCC) (Primary)    2. Essential hypertension    3. Adrenal insufficiency (CMS/HCC)    4. Endocrine hypertension    5. Asymptomatic LV dysfunction lvef 30%    6. Abnormal ECG    7. Hot flashes due to surgical menopause           ___________________________________________________________________________________________________  Health maintenance and recommendations      Similar recommendations as last visit       Offered to give patient  a copy of my notes       Questions were encouraged, asked and answered to the patient's  understanding and satisfaction. Questions if any regarding current medications and side effects, need for refills and importance of compliance to medications stressed.    Reviewed available prior notes, consults, prior visits, laboratory findings, radiology and cardiology relevant reports. Updated chart as applicable. I have reviewed the patient's medical history in detail and updated the computerized patient record as relevant.      Updated patient regarding any new or relevant abnormalities on review of records or any new findings on physical exam. Mentioned to patient about  purpose of visit and desirable health short and long term goals and objectives.    Primary to monitor CBC CMP Lipid panel and TSH as applicable    ___________________________________________________________________________________________________________________________________________    Plan       Discussed results of prior testing with patient  Patient expressed understanding  Encouraged and answered all questions   Discussed with the patient and all questioned fully answered. She will call me if any problems arise.     Check BP and heart rates twice daily at least 3x / week, week a month  at home and bring a recording for me to review next visit  If BP >130/85 or < 100/60 persistently over 3 reading 30 mins apart call sooner      Overall doing well no new cardiovascular symptoms and therefore no additional cardiac testing is required   If any interim issues arise will call me for further evaluation.     She has received Covid vaccine         Return in about 6 months (around 7/29/2021).

## 2021-07-30 ENCOUNTER — TELEPHONE (OUTPATIENT)
Dept: CARDIOLOGY | Facility: CLINIC | Age: 41
End: 2021-07-30

## 2021-07-30 NOTE — TELEPHONE ENCOUNTER
Patient was under the impression she was not coming back in for a checkup until her 1 year follow up. She is currently rescheduled for August 20 th from her cancelled appointment on 07/30/21. She said she did not mind being seen if you felt she needed to but did express that she was not having any issues at this time. Do you think it's okay to push it another six months?

## 2021-09-17 ENCOUNTER — OFFICE VISIT (OUTPATIENT)
Dept: CARDIOLOGY | Facility: CLINIC | Age: 41
End: 2021-09-17

## 2021-09-17 VITALS
WEIGHT: 160 LBS | OXYGEN SATURATION: 98 % | DIASTOLIC BLOOD PRESSURE: 70 MMHG | HEIGHT: 63 IN | SYSTOLIC BLOOD PRESSURE: 138 MMHG | HEART RATE: 70 BPM | BODY MASS INDEX: 28.35 KG/M2

## 2021-09-17 DIAGNOSIS — R94.31 ABNORMAL ECG: ICD-10-CM

## 2021-09-17 DIAGNOSIS — B20 HIV INFECTION, UNSPECIFIED SYMPTOM STATUS (HCC): ICD-10-CM

## 2021-09-17 DIAGNOSIS — I10 ESSENTIAL HYPERTENSION: Primary | ICD-10-CM

## 2021-09-17 DIAGNOSIS — I51.9 ASYMPTOMATIC LV DYSFUNCTION: ICD-10-CM

## 2021-09-17 PROCEDURE — 93000 ELECTROCARDIOGRAM COMPLETE: CPT | Performed by: INTERNAL MEDICINE

## 2021-09-17 PROCEDURE — 99214 OFFICE O/P EST MOD 30 MIN: CPT | Performed by: INTERNAL MEDICINE

## 2021-09-17 NOTE — PROGRESS NOTES
Vannesa Torres  [id number]  1980  41 y.o.  female    Referring Provider: Provider, No Known    Reason for  Visit:        Here for routine follow up   visit for severe  left ventricular dysfunction  Prior  admission in [facility] after taking Percocet  Smoker  ETOH use  Cardiac workup test results as below   Repeat echo shows normal LVEF  Not doing any drugs    Subjective      Overall feels the same   No new events or complaints since last visit   Overall the patient feels no major change from baseline symptoms   Similar symptoms as during last visit      Overall feeling well   No chest pain or shortness of breath     No palpitations  No significant pedal edema    Compliant with medications and dietary advice  Good effort tolerance    No presyncope or syncope  Compliant with medications    Tolerating current medications well with no untoward side effects   Compliant with prescribed medication regimen. Tries to adhere to cardiac diet.     No bleeding, excessive bruising, gait instability or fall risks        History of present illness:  Vannesa Torres is a 41 y.o. yo female with history of HIV  who presents today for   Chief Complaint   Patient presents with   • Hypertension     9 month follow up    .    History  Past Medical History:   Diagnosis Date   • HIV (human immunodeficiency virus infection) (CMS/HCC)    • Hypertension    ,   Past Surgical History:   Procedure Laterality Date   •  SECTION      x  3   • COLONOSCOPY     • CYSTOSCOPY  2016   • DILATATION AND CURETTAGE     • TOTAL LAPAROSCOPIC HYSTERECTOMY SALPINGO OOPHORECTOMY Left 2016    with bilateral salpingectomy   daVinci assisted   ,   History reviewed. No pertinent family history.,   Social History     Tobacco Use   • Smoking status: Current Every Day Smoker     Packs/day: 0.25     Types: Cigarettes   • Smokeless tobacco: Never Used   Substance Use Topics   • Alcohol use: Yes     Comment: occasionally  "  • Drug use: No   ,     Medications  Current Outpatient Medications   Medication Sig Dispense Refill   • aspirin 81 MG EC tablet Take 81 mg by mouth Daily.     • citalopram (CeleXA) 40 MG tablet TAKE 1 TABLET BY MOUTH DAILY (CELEXA)]  2   • famotidine (PEPCID) 20 MG tablet Take 20 mg by mouth 2 (Two) Times a Day.     • GENVOYA [phone] MG per tablet TAKE 1 TABLET BY MOUTH DAILY (take 2 hours before or 2 hours after Calcium, Antacids, or Multi-vitamins)  1   • losartan (COZAAR) 25 MG tablet TAKE 1 TABLET BY MOUTH DAILY 90 tablet 3   • metoprolol succinate XL (TOPROL-XL) 25 MG 24 hr tablet TAKE 1 TABLET BY MOUTH DAILY 90 tablet 3   • NORVIR 100 MG tablet TAKE 1 TABLET BY MOUTH DAILY  2   • PREZISTA 800 MG tablet tablet TAKE 1 TABLET BY MOUTH DAILY  2   • traZODone (DESYREL) 100 MG tablet TAKE 1 TABLET BY MOUTH nightly  2   • TRUVADA 200-300 MG per tablet TAKE 1 TABLET BY MOUTH DAILY  2     No current facility-administered medications for this visit.       Allergies:  Patient has no known allergies.    Review of Systems  Review of Systems   Constitutional: Positive for malaise/fatigue.   HENT: Negative.    Eyes: Negative.    Cardiovascular: Negative for chest pain, claudication, cyanosis, dyspnea on exertion, irregular heartbeat, leg swelling, near-syncope, orthopnea, palpitations, paroxysmal nocturnal dyspnea and syncope.   Respiratory: Negative.    Endocrine: Negative.    Hematologic/Lymphatic: Negative.    Skin: Negative.    Gastrointestinal: Negative for anorexia.   Genitourinary: Negative.    Neurological: Negative.    Psychiatric/Behavioral: Negative.        Objective     Physical Exam:  /70   Pulse 70   Ht 160 cm (63\")   Wt 72.6 kg (160 lb)   SpO2 98%   BMI 28.34 kg/m²     Physical Exam   Constitutional: She appears well-developed.   HENT:   Head: Normocephalic.   Neck: Normal carotid pulses and no JVD present. No tracheal tenderness present. Carotid bruit is not present. No tracheal deviation " present.   Cardiovascular: Regular rhythm, normal heart sounds and normal pulses.   Pulmonary/Chest: Effort normal. No stridor.   Abdominal: Soft. She exhibits no distension. There is no rigidity.   Neurological: She is alert. No cranial nerve deficit or sensory deficit.   Skin: Skin is warm.   Psychiatric: Her speech is normal and behavior is normal.       Results Review:      Results for orders placed during the hospital encounter of 10/23/20    Adult Transthoracic Echo Complete W/ Cont if Necessary Per Protocol    Interpretation Summary  · Left ventricular ejection fraction appears to be 56 - 60%. Left ventricular systolic function is normal.  · Left ventricular diastolic function was normal.  · No pulmonary hypertension  · Normal study     ____________________________________________________________________________________________________________________________________________  Health maintenance and recommendations    Low salt/ HTN/ Heart healthy carbohydrate restricted cardiac diet (printed dietary and general instructions provided for home review )  The patient is advised to reduce or avoid caffeine or other cardiac stimulants.     The patient was advised to avoid long term NSAIDS , use Tylenol PRN instead  Avoid cardiac stimulants including common drugs like Pseudoephedrine or excessive amounts of caffeine  Monitor for any signs of bleeding including red or dark stools. Fall precautions.   Advised staying uptodate with immunizations per established standard guidelines.  Offered to give patient  a copy      Questions were encouraged, asked and answered to the patient's  understanding and satisfaction. Questions if any regarding current medications and side effects, need for refills and importance of compliance to medications stressed.    Reviewed available prior notes, consults, prior visits, laboratory findings, radiology and cardiology relevant reports. Updated chart as applicable. I have reviewed the  patient's medical history in detail and updated the computerized patient record as relevant.      Updated patient regarding any new or relevant abnormalities on review of records or any new findings on physical exam. Mentioned to patient about purpose of visit and desirable health short and long term goals and objectives.    Primary to monitor CBC CMP Lipid panel and TSH as applicable    ___________________________________________________________________________________________________________________________________________             ECG 12 Lead    Date/Time: 9/17/2021 11:06 AM  Performed by: Claudio Mcintosh MD  Authorized by: Claudio Mcintosh MD   Comparison: compared with previous ECG from 6/26/2020  Similar to previous ECG  Rhythm: sinus rhythm  Rate: normal  Conduction: right bundle branch block  QRS axis: normal  Other: no other findings    Clinical impression: abnormal EKG            Assessment/Plan   Diagnoses and all orders for this visit:    1. Essential hypertension (Primary)    2. Asymptomatic LV dysfunction lvef 30%  -     Adult Transthoracic Echo Complete w/ Color, Spectral and Contrast if necessary per protocol; Future    3. HIV infection, unspecified symptom status (CMS/HCC)    4. Abnormal ECG  -     Adult Transthoracic Echo Complete w/ Color, Spectral and Contrast if necessary per protocol; Future           Plan       Patient expressed understanding  Encouraged and answered all questions   Discussed with the patient and all questioned fully answered. She will call me if any problems arise.   Discussed results of prior testing with patient : echo 2020  as well electrocardiogram from today      Monitor left ventricular ejection fraction by serial echo   Orders Placed This Encounter   Procedures   • Adult Transthoracic Echo Complete w/ Color, Spectral and Contrast if necessary per protocol     Myocardial strain to be performed during echocardiogram as long as technically feasible     Standing Status:    Future     Standing Expiration Date:   9/17/2022     Order Specific Question:   Reason for exam?     Answer:   Heart Failure, Cardiomyopathy, or Sytemic or Pulmonary Hypertension     Order Specific Question:   Release to patient     Answer:   Immediate        I support the patient's decision to take the Covid -19 vaccine   Had both dose   No major issues   Now fully immunized    Recommend booster      Check BP and heart rates twice daily at least 3x / week, week a month  at home and bring a recording for me to review next visit  If BP >130/85 or < 100/60 persistently over 3 reading 30 mins apart call sooner            Return in about 9 months (around 6/17/2022).

## 2022-04-15 ENCOUNTER — HOSPITAL ENCOUNTER (OUTPATIENT)
Dept: CARDIOLOGY | Facility: HOSPITAL | Age: 42
Discharge: HOME | End: 2022-04-15
Admitting: INTERNAL MEDICINE

## 2022-04-15 VITALS
SYSTOLIC BLOOD PRESSURE: 138 MMHG | WEIGHT: 160 LBS | DIASTOLIC BLOOD PRESSURE: 70 MMHG | HEIGHT: 63 IN | BODY MASS INDEX: 28.35 KG/M2

## 2022-04-15 DIAGNOSIS — I51.9 ASYMPTOMATIC LV DYSFUNCTION: ICD-10-CM

## 2022-04-15 DIAGNOSIS — R94.31 ABNORMAL ECG: ICD-10-CM

## 2022-04-15 PROCEDURE — 93356 MYOCRD STRAIN IMG SPCKL TRCK: CPT

## 2022-04-15 PROCEDURE — 93306 TTE W/DOPPLER COMPLETE: CPT | Performed by: INTERNAL MEDICINE

## 2022-04-15 PROCEDURE — 93306 TTE W/DOPPLER COMPLETE: CPT

## 2022-04-15 PROCEDURE — 93356 MYOCRD STRAIN IMG SPCKL TRCK: CPT | Performed by: INTERNAL MEDICINE

## 2022-04-16 LAB
BH CV ECHO LEFT VENTRICLE GLOBAL LONGITUDINAL STRAIN: -15 %
BH CV ECHO MEAS - AO MAX PG: 5.2 MMHG
BH CV ECHO MEAS - AO MEAN PG: 4 MMHG
BH CV ECHO MEAS - AO ROOT DIAM: 3.1 CM
BH CV ECHO MEAS - AO V2 MAX: 114 CM/SEC
BH CV ECHO MEAS - AO V2 VTI: 28.5 CM
BH CV ECHO MEAS - AVA(I,D): 2.26 CM2
BH CV ECHO MEAS - EDV(CUBED): 91.7 ML
BH CV ECHO MEAS - EDV(MOD-SP4): 72 ML
BH CV ECHO MEAS - EF(MOD-SP4): 53.8 %
BH CV ECHO MEAS - ESV(CUBED): 24.4 ML
BH CV ECHO MEAS - ESV(MOD-SP4): 33.3 ML
BH CV ECHO MEAS - FS: 35.7 %
BH CV ECHO MEAS - IVS/LVPW: 0.88 CM
BH CV ECHO MEAS - IVSD: 1.05 CM
BH CV ECHO MEAS - LA DIMENSION: 2.9 CM
BH CV ECHO MEAS - LAT PEAK E' VEL: 11.9 CM/SEC
BH CV ECHO MEAS - LV DIASTOLIC VOL/BSA (35-75): 40.9 CM2
BH CV ECHO MEAS - LV MASS(C)D: 180.2 GRAMS
BH CV ECHO MEAS - LV MAX PG: 2.5 MMHG
BH CV ECHO MEAS - LV MEAN PG: 1 MMHG
BH CV ECHO MEAS - LV SYSTOLIC VOL/BSA (12-30): 18.9 CM2
BH CV ECHO MEAS - LV V1 MAX: 79.1 CM/SEC
BH CV ECHO MEAS - LV V1 VTI: 18.6 CM
BH CV ECHO MEAS - LVIDD: 4.5 CM
BH CV ECHO MEAS - LVIDS: 2.9 CM
BH CV ECHO MEAS - LVOT AREA: 3.5 CM2
BH CV ECHO MEAS - LVOT DIAM: 2.1 CM
BH CV ECHO MEAS - LVPWD: 1.19 CM
BH CV ECHO MEAS - MED PEAK E' VEL: 12.7 CM/SEC
BH CV ECHO MEAS - MV A MAX VEL: 52.8 CM/SEC
BH CV ECHO MEAS - MV DEC SLOPE: 283 CM/SEC2
BH CV ECHO MEAS - MV DEC TIME: 0.27 MSEC
BH CV ECHO MEAS - MV E MAX VEL: 75.6 CM/SEC
BH CV ECHO MEAS - MV E/A: 1.43
BH CV ECHO MEAS - SI(MOD-SP4): 22 ML/M2
BH CV ECHO MEAS - SV(LVOT): 64.4 ML
BH CV ECHO MEAS - SV(MOD-SP4): 38.7 ML
BH CV ECHO MEAS - TR MAX PG: 15.5 MMHG
BH CV ECHO MEAS - TR MAX VEL: 197 CM/SEC
BH CV ECHO MEASUREMENTS AVERAGE E/E' RATIO: 6.15
LEFT ATRIUM VOLUME INDEX: 18.5 ML/M2
LEFT ATRIUM VOLUME: 32.6 CM3
MAXIMAL PREDICTED HEART RATE: 180 BPM
STRESS TARGET HR: 153 BPM

## 2022-06-17 ENCOUNTER — OFFICE VISIT (OUTPATIENT)
Dept: CARDIOLOGY | Facility: CLINIC | Age: 42
End: 2022-06-17

## 2022-06-17 VITALS
HEART RATE: 80 BPM | DIASTOLIC BLOOD PRESSURE: 72 MMHG | OXYGEN SATURATION: 98 % | HEIGHT: 63 IN | SYSTOLIC BLOOD PRESSURE: 108 MMHG | BODY MASS INDEX: 28.53 KG/M2 | WEIGHT: 161 LBS

## 2022-06-17 DIAGNOSIS — B20 HIV INFECTION, UNSPECIFIED SYMPTOM STATUS: Primary | ICD-10-CM

## 2022-06-17 DIAGNOSIS — R94.31 ABNORMAL ECG: ICD-10-CM

## 2022-06-17 DIAGNOSIS — I51.9 ASYMPTOMATIC LV DYSFUNCTION: ICD-10-CM

## 2022-06-17 DIAGNOSIS — I10 PRIMARY HYPERTENSION: ICD-10-CM

## 2022-06-17 DIAGNOSIS — Z72.0 VAPES NICOTINE CONTAINING SUBSTANCE: ICD-10-CM

## 2022-06-17 DIAGNOSIS — E27.40 ADRENAL INSUFFICIENCY: ICD-10-CM

## 2022-06-17 PROCEDURE — 99214 OFFICE O/P EST MOD 30 MIN: CPT | Performed by: INTERNAL MEDICINE

## 2022-06-17 PROCEDURE — 93000 ELECTROCARDIOGRAM COMPLETE: CPT | Performed by: INTERNAL MEDICINE

## 2022-06-17 NOTE — PROGRESS NOTES
Vannesa Torres  [id number]  1980  42 y.o.  female    Referring Provider: Provider, No Known    Reason for  Visit:        Here for routine follow up   visit for severe  left ventricular dysfunction  Prior  admission in [facility] after taking Percocet  Vapes  ETOH use, rare   Cardiac workup test results as below   Repeat echo shows normal LVEF as below with strain of -15%  Not doing any drugs    Subjective    Overall feeling well   No chest pain or shortness of breath     No palpitations  Dependent edema worse on sitting up towards evening     Compliant with medications and dietary advice  Good effort tolerance    No presyncope or syncope  Compliant with medications    Tolerating current medications well with no untoward side effects   Compliant with prescribed medication regimen. Tries to adhere to cardiac diet.      No bleeding, excessive bruising, gait instability or fall risks    BP well controlled at home.         History of present illness:  Vannesa Torres is a 42 y.o. yo female with cardiomyopathy  HIV  who presents today for   Chief Complaint   Patient presents with   • Hypertension   .    History  Past Medical History:   Diagnosis Date   • HIV (human immunodeficiency virus infection) (HCC)    • Hypertension    ,   Past Surgical History:   Procedure Laterality Date   •  SECTION      x  3   • COLONOSCOPY     • CYSTOSCOPY  2016   • DILATATION AND CURETTAGE     • TOTAL LAPAROSCOPIC HYSTERECTOMY SALPINGO OOPHORECTOMY Left 2016    with bilateral salpingectomy   daVinci assisted   ,   History reviewed. No pertinent family history.,   Social History     Tobacco Use   • Smoking status: Current Every Day Smoker     Packs/day: 0.25     Types: Cigarettes   • Smokeless tobacco: Never Used   Substance Use Topics   • Alcohol use: Yes     Comment: occasionally   • Drug use: No   ,     Medications  Current Outpatient Medications   Medication Sig Dispense Refill   • aspirin  "81 MG EC tablet Take 81 mg by mouth Daily.     • citalopram (CeleXA) 40 MG tablet TAKE 1 TABLET BY MOUTH DAILY (CELEXA)]  2   • famotidine (PEPCID) 20 MG tablet Take 20 mg by mouth 2 (Two) Times a Day.     • GENVOYA [phone] MG per tablet TAKE 1 TABLET BY MOUTH DAILY (take 2 hours before or 2 hours after Calcium, Antacids, or Multi-vitamins)  1   • losartan (COZAAR) 25 MG tablet TAKE 1 TABLET BY MOUTH DAILY 90 tablet 3   • metoprolol succinate XL (TOPROL-XL) 25 MG 24 hr tablet TAKE 1 TABLET BY MOUTH DAILY 90 tablet 3   • NORVIR 100 MG tablet TAKE 1 TABLET BY MOUTH DAILY  2   • PREZISTA 800 MG tablet tablet TAKE 1 TABLET BY MOUTH DAILY  2   • traZODone (DESYREL) 100 MG tablet TAKE 1 TABLET BY MOUTH nightly  2   • TRUVADA 200-300 MG per tablet TAKE 1 TABLET BY MOUTH DAILY  2     No current facility-administered medications for this visit.       Allergies:  Patient has no known allergies.    Review of Systems  Review of Systems   Constitutional: Positive for malaise/fatigue.   HENT: Negative.    Eyes: Negative.    Cardiovascular: Negative for chest pain, claudication, cyanosis, dyspnea on exertion, irregular heartbeat, leg swelling, near-syncope, orthopnea, palpitations, paroxysmal nocturnal dyspnea and syncope.   Respiratory: Negative.    Endocrine: Negative.    Hematologic/Lymphatic: Negative.    Skin: Negative.    Gastrointestinal: Negative for anorexia.   Genitourinary: Negative.    Neurological: Negative.    Psychiatric/Behavioral: Negative.        Objective     Physical Exam:  /72   Pulse 80   Ht 160 cm (63\")   Wt 73 kg (161 lb)   SpO2 98%   BMI 28.52 kg/m²     Physical Exam   Constitutional: She appears well-developed.   HENT:   Head: Normocephalic.   Neck: Normal carotid pulses and no JVD present. No tracheal tenderness present. Carotid bruit is not present. No tracheal deviation present.   Cardiovascular: Regular rhythm, normal heart sounds and normal pulses.   Pulmonary/Chest: Effort normal. " No stridor.   Abdominal: Soft. She exhibits no distension. There is no rigidity.   Neurological: She is alert. No cranial nerve deficit or sensory deficit.   Skin: Skin is warm.   Psychiatric: Her speech is normal and behavior is normal.       Results Review:      Results for orders placed during the hospital encounter of 04/15/22    Adult Transthoracic Echo Complete w/ Color, Spectral and Contrast if necessary per protocol    Interpretation Summary  · Left ventricular ejection fraction appears to be 56 - 60%. Left ventricular systolic function is normal.  · Left ventricular diastolic function was normal.  · Abnormal global longitudinal LV strain (GLS) = -15.0%  · Estimated right ventricular systolic pressure from tricuspid regurgitation is normal (<35 mmHg).     Vannesa Torres  Echo Complete w/Doppler and Color Flow  Order# [id number]  Reading physician: Claudio Mcintosh MD Ordering physician: Claudio Mcintosh MD Study date: 10/23/20       Patient Information    Patient Name   Vannesa Torres MRN   [id number] Legal Sex   Female  (Age)   1980 (42 y.o.)     PACS Images     Show images for Adult Transthoracic Echo Complete W/ Cont if Necessary Per Protocol   Sedation Narrator Report    Sedation Narrator Report        Interpretation Summary    · Left ventricular ejection fraction appears to be 56 - 60%. Left ventricular systolic function is normal.  · Left ventricular diastolic function was normal.  · No pulmonary hypertension  · Normal study         ___________________________________________________________________________________________________________________________________________  Health maintenance and recommendations    Low salt/ HTN/ Heart healthy carbohydrate restricted cardiac diet (printed dietary and general instructions provided for home review )  The patient is advised to reduce or avoid caffeine or other cardiac stimulants.     The patient was advised to avoid long term NSAIDS , use Tylenol PRN  instead  Avoid cardiac stimulants including common drugs like Pseudoephedrine or excessive amounts of caffeine  Monitor for any signs of bleeding including red or dark stools. Fall precautions.   Advised staying uptodate with immunizations per established standard guidelines.  Offered to give patient  a copy      Questions were encouraged, asked and answered to the patient's  understanding and satisfaction. Questions if any regarding current medications and side effects, need for refills and importance of compliance to medications stressed.    Reviewed available prior notes, consults, prior visits, laboratory findings, radiology and cardiology relevant reports. Updated chart as applicable. I have reviewed the patient's medical history in detail and updated the computerized patient record as relevant.      Updated patient regarding any new or relevant abnormalities on review of records or any new findings on physical exam. Mentioned to patient about purpose of visit and desirable health short and long term goals and objectives.    Primary to monitor CBC CMP Lipid panel and TSH as applicable    ___________________________________________________________________________________________________________________________________________             ECG 12 Lead    Date/Time: 6/17/2022 10:59 AM  Performed by: Claudio Mcintosh MD  Authorized by: Claudio Mcintosh MD   Comparison: compared with previous ECG from 9/17/2021  Similar to previous ECG  Rhythm: sinus rhythm  Rate: normal  Conduction: right bundle branch block  ST Segments: ST segments normal  QRS axis: normal  Other: no other findings    Clinical impression: abnormal EKG            Assessment & Plan   Diagnoses and all orders for this visit:    1. HIV infection, unspecified symptom status (HCC) (Primary)    2. Primary hypertension    3. Asymptomatic LV dysfunction lvef 30%    4. Adrenal insufficiency (HCC)    5. Abnormal ECG    6. Vapes nicotine containing  substance    Other orders  -     ECG 12 Lead           Plan       Patient expressed understanding  Encouraged and answered all questions   Discussed with the patient and all questioned fully answered. She will call me if any problems arise.   Discussed results of prior testing with patient : echo 2020 and this year   as well electrocardiogram from today      Overall doing well no new cardiovascular symptoms and therefore no additional cardiac testing is required prior to next visit  If any interim issues arise will call me for further evaluation.      I support the patient's decision to take the Covid -19 vaccine   Had both dose   No major issues   Now fully immunized    Recommend booster      Check BP and heart rates twice daily at least 3x / week, week a month  at home and bring a recording for me to review next visit  If BP >130/85 or < 100/60 persistently over 3 reading 30 mins apart call sooner      Follow up with Jennie NICE , Ye NICE, Katelynn Stephens PA-C  or myself            Return in about 9 months (around 3/17/2023).

## 2022-09-01 ENCOUNTER — TRANSCRIBE ORDERS (OUTPATIENT)
Dept: ADMINISTRATIVE | Facility: HOSPITAL | Age: 42
End: 2022-09-01

## 2022-09-01 DIAGNOSIS — Z12.31 ENCOUNTER FOR SCREENING MAMMOGRAM FOR MALIGNANT NEOPLASM OF BREAST: Primary | ICD-10-CM

## 2022-09-16 ENCOUNTER — APPOINTMENT (OUTPATIENT)
Dept: MAMMOGRAPHY | Facility: HOSPITAL | Age: 42
End: 2022-09-16

## 2023-04-27 ENCOUNTER — OFFICE VISIT (OUTPATIENT)
Dept: CARDIOLOGY | Facility: CLINIC | Age: 43
End: 2023-04-27
Payer: COMMERCIAL

## 2023-04-27 VITALS
DIASTOLIC BLOOD PRESSURE: 88 MMHG | HEIGHT: 63 IN | OXYGEN SATURATION: 99 % | HEART RATE: 86 BPM | WEIGHT: 165 LBS | BODY MASS INDEX: 29.23 KG/M2 | SYSTOLIC BLOOD PRESSURE: 137 MMHG

## 2023-04-27 DIAGNOSIS — R94.31 ABNORMAL ECG: ICD-10-CM

## 2023-04-27 DIAGNOSIS — B20 HIV INFECTION, UNSPECIFIED SYMPTOM STATUS: ICD-10-CM

## 2023-04-27 DIAGNOSIS — Z72.0 VAPES NICOTINE CONTAINING SUBSTANCE: ICD-10-CM

## 2023-04-27 DIAGNOSIS — I10 PRIMARY HYPERTENSION: Primary | ICD-10-CM

## 2023-04-27 NOTE — PROGRESS NOTES
Vannsea Torres  [id number]  1980  43 y.o.  female    Referring Provider: Provider, No Known    Reason for  Visit:        Here for routine follow up   visit for severe  left ventricular dysfunction  Prior  admission in [facility] after taking Percocet  Vapes  ETOH use, rare   Cardiac workup test results as below   Repeat echo shows normal LVEF as below with strain of -15%  Not doing any drugs    Subjective    Overall feels the same   No new events or complaints since last visit   Overall the patient feels no major change from baseline symptoms   Similar symptoms as during last visit      Overall feeling well   No chest pain or shortness of breath     No palpitations  Dependent edema worse on sitting up towards evening     Compliant with medications and dietary advice  Good effort tolerance    No presyncope or syncope  Compliant with medications    Tolerating current medications well with no untoward side effects   Compliant with prescribed medication regimen. Tries to adhere to cardiac diet.      No bleeding, excessive bruising, gait instability or fall risks    BP well controlled at home.       Excellent effort tolerance with no cardiovascular limitations and at the patient's baseline       History of present illness:  Vannesa Torres is a 43 y.o. yo female with cardiomyopathy  HIV  who presents today for   Chief Complaint   Patient presents with   • Hypertension     10 month follow up   .    History  Past Medical History:   Diagnosis Date   • HIV (human immunodeficiency virus infection)    • Hypertension    ,   Past Surgical History:   Procedure Laterality Date   •  SECTION      x  3   • COLONOSCOPY     • CYSTOSCOPY  2016   • DILATATION AND CURETTAGE     • TOTAL LAPAROSCOPIC HYSTERECTOMY SALPINGO OOPHORECTOMY Left 2016    with bilateral salpingectomy   daVinci assisted   ,   History reviewed. No pertinent family history.,   Social History     Tobacco Use   •  Smoking status: Every Day     Packs/day: 0.25     Types: Cigarettes   • Smokeless tobacco: Never   Vaping Use   • Vaping Use: Never used   Substance Use Topics   • Alcohol use: Yes     Comment: occasionally   • Drug use: No   ,     Medications  Current Outpatient Medications   Medication Sig Dispense Refill   • aspirin 81 MG EC tablet Take 1 tablet by mouth Daily.     • citalopram (CeleXA) 40 MG tablet TAKE 1 TABLET BY MOUTH DAILY (CELEXA)]  2   • famotidine (PEPCID) 20 MG tablet Take 1 tablet by mouth 2 (Two) Times a Day.     • GENVOYA [phone] MG per tablet TAKE 1 TABLET BY MOUTH DAILY (take 2 hours before or 2 hours after Calcium, Antacids, or Multi-vitamins)  1   • losartan (COZAAR) 25 MG tablet TAKE 1 TABLET BY MOUTH DAILY 90 tablet 3   • metoprolol succinate XL (TOPROL-XL) 25 MG 24 hr tablet TAKE 1 TABLET BY MOUTH DAILY 90 tablet 3   • NORVIR 100 MG tablet TAKE 1 TABLET BY MOUTH DAILY  2   • PREZISTA 800 MG tablet tablet TAKE 1 TABLET BY MOUTH DAILY  2   • traZODone (DESYREL) 100 MG tablet TAKE 1 TABLET BY MOUTH nightly  2   • TRUVADA 200-300 MG per tablet TAKE 1 TABLET BY MOUTH DAILY  2     No current facility-administered medications for this visit.       Allergies:  Patient has no known allergies.    Review of Systems  Review of Systems   Constitutional: Negative for malaise/fatigue.   HENT: Negative.    Eyes: Negative.    Cardiovascular: Negative for chest pain, claudication, cyanosis, dyspnea on exertion, irregular heartbeat, leg swelling, near-syncope, orthopnea, palpitations, paroxysmal nocturnal dyspnea and syncope.   Respiratory: Negative.    Endocrine: Negative.    Hematologic/Lymphatic: Negative.    Skin: Negative.    Gastrointestinal: Negative for anorexia.   Genitourinary: Negative.    Neurological: Negative.    Psychiatric/Behavioral: Negative.        Objective     Physical Exam:  /88 (BP Location: Left arm, Patient Position: Sitting, Cuff Size: Large Adult)   Pulse 86   Ht 160 cm  "(62.99\")   Wt 74.8 kg (165 lb)   SpO2 99%   BMI 29.24 kg/m²     Physical Exam   Constitutional: She appears well-developed.   HENT:   Head: Normocephalic.   Neck: Normal carotid pulses and no JVD present. No tracheal tenderness present. Carotid bruit is not present. No tracheal deviation present.   Cardiovascular: Regular rhythm, normal heart sounds and normal pulses.   Pulmonary/Chest: Effort normal. No stridor.   Abdominal: Soft. She exhibits no distension. There is no rigidity.   Neurological: She is alert. No cranial nerve deficit or sensory deficit.   Skin: Skin is warm.   Psychiatric: Her speech is normal and behavior is normal.       Results Review:      Results for orders placed during the hospital encounter of 04/15/22    Adult Transthoracic Echo Complete w/ Color, Spectral and Contrast if necessary per protocol    Interpretation Summary  · Left ventricular ejection fraction appears to be 56 - 60%. Left ventricular systolic function is normal.  · Left ventricular diastolic function was normal.  · Abnormal global longitudinal LV strain (GLS) = -15.0%  · Estimated right ventricular systolic pressure from tricuspid regurgitation is normal (<35 mmHg).     Vannesa Torres  Echo Complete w/Doppler and Color Flow  Order# [id number]  Reading physician: Claudio Mcintosh MD Ordering physician: Claudio Mcintosh MD Study date: 10/23/20       Patient Information    Patient Name   Vannesa Torres MRN   [id number] Legal Sex   Female  (Age)   1980 (42 y.o.)     PACS Images     Show images for Adult Transthoracic Echo Complete W/ Cont if Necessary Per Protocol   Sedation Narrator Report    Sedation Narrator Report        Interpretation Summary    · Left ventricular ejection fraction appears to be 56 - 60%. Left ventricular systolic function is normal.  · Left ventricular diastolic function was normal.  · No pulmonary hypertension  · Normal study   "       ___________________________________________________________________________________________________________________________________________  Health maintenance and recommendations    Low salt/ HTN/ Heart healthy carbohydrate restricted cardiac diet (printed dietary and general instructions provided for home review )  The patient is advised to reduce or avoid caffeine or other cardiac stimulants.     The patient was advised to avoid long term NSAIDS , use Tylenol PRN instead  Avoid cardiac stimulants including common drugs like Pseudoephedrine or excessive amounts of caffeine  Monitor for any signs of bleeding including red or dark stools. Fall precautions.   Advised staying uptodate with immunizations per established standard guidelines.  Offered to give patient  a copy      Questions were encouraged, asked and answered to the patient's  understanding and satisfaction. Questions if any regarding current medications and side effects, need for refills and importance of compliance to medications stressed.    Reviewed available prior notes, consults, prior visits, laboratory findings, radiology and cardiology relevant reports. Updated chart as applicable. I have reviewed the patient's medical history in detail and updated the computerized patient record as relevant.      Updated patient regarding any new or relevant abnormalities on review of records or any new findings on physical exam. Mentioned to patient about purpose of visit and desirable health short and long term goals and objectives.    Primary to monitor CBC CMP Lipid panel and TSH as applicable    ___________________________________________________________________________________________________________________________________________             ECG 12 Lead    Date/Time: 4/27/2023 11:57 AM  Performed by: Claudio Mcintosh MD  Authorized by: Claudio Mcintosh MD   Comparison: compared with previous ECG from 6/17/2022  Comparison to previous ECG: Ventricular  rate changed from 70  to 80  beats per minute    Rhythm: sinus rhythm  Rate: normal  Conduction: right bundle branch block  ST Segments: ST segments normal  T Waves: T waves normal  QRS axis: normal  Other: no other findings    Clinical impression: abnormal EKG            Assessment & Plan   Diagnoses and all orders for this visit:    1. Primary hypertension (Primary)    2. HIV infection, unspecified symptom status    3. Abnormal ECG    4. Vapes nicotine containing substance    Other orders  -     ECG 12 Lead           Plan       Patient expressed understanding  Encouraged and answered all questions   Discussed with the patient and all questioned fully answered. She will call me if any problems arise.   Discussed results of prior testing with patient : echo 2020 and this year   as well electrocardiogram from today      Overall doing well no new cardiovascular symptoms and therefore no additional cardiac testing is required prior to next visit  If any interim issues arise will call me for further evaluation.    Monitor left ventricular ejection fraction by serial echo      I support the patient's decision to take the Covid -19 vaccine   Had both dose   No major issues   Now fully immunized    Recommend booster      Check BP and heart rates twice daily at least 3x / week, week a month  at home and bring a recording for me to review next visit  If BP >130/85 or < 100/60 persistently over 3 reading 30 mins apart call sooner      She will call her primary for follow up in [facility]   Bring copies or have primary fax to our office labs and other tests prior to next visit   Will try to get her recent labs          Return in about 6 months (around 10/27/2023).

## 2023-04-28 ENCOUNTER — TELEPHONE (OUTPATIENT)
Dept: CARDIOLOGY | Facility: CLINIC | Age: 43
End: 2023-04-28
Payer: COMMERCIAL

## 2023-04-28 NOTE — TELEPHONE ENCOUNTER
Call placed to [facility] [phone].  They are closed on fridays. A detailed message was left requesting all current lab work.

## 2023-04-28 NOTE — TELEPHONE ENCOUNTER
----- Message from Claudio Mcintosh MD sent at 4/27/2023 12:04 PM CDT -----  Regarding: Labs  Get labs from [facility] clinic [place]

## 2023-09-15 ENCOUNTER — TRANSCRIBE ORDERS (OUTPATIENT)
Dept: ADMINISTRATIVE | Facility: HOSPITAL | Age: 43
End: 2023-09-15
Payer: COMMERCIAL

## 2023-09-15 DIAGNOSIS — Z12.31 ENCOUNTER FOR SCREENING MAMMOGRAM FOR MALIGNANT NEOPLASM OF BREAST: Primary | ICD-10-CM

## 2025-02-19 ENCOUNTER — TELEPHONE (OUTPATIENT)
Dept: PRIMARY CARE CLINIC | Age: 45
End: 2025-02-19

## 2025-02-19 NOTE — TELEPHONE ENCOUNTER
Called patient and informed that a new patient appointment was not available sooner than 3/11. NILSA

## 2025-03-07 ENCOUNTER — TRANSCRIBE ORDERS (OUTPATIENT)
Dept: ADMINISTRATIVE | Facility: HOSPITAL | Age: 45
End: 2025-03-07
Payer: COMMERCIAL

## 2025-03-07 DIAGNOSIS — Z12.31 ENCOUNTER FOR SCREENING MAMMOGRAM FOR BREAST CANCER: Primary | ICD-10-CM

## 2025-03-08 SDOH — HEALTH STABILITY: PHYSICAL HEALTH: ON AVERAGE, HOW MANY DAYS PER WEEK DO YOU ENGAGE IN MODERATE TO STRENUOUS EXERCISE (LIKE A BRISK WALK)?: 7 DAYS

## 2025-03-08 SDOH — HEALTH STABILITY: PHYSICAL HEALTH: ON AVERAGE, HOW MANY MINUTES DO YOU ENGAGE IN EXERCISE AT THIS LEVEL?: 30 MIN

## 2025-03-10 NOTE — PROGRESS NOTES
Urine neg     Propoxyphene Screen, Urine neg     Synthetic Cannabinoids (K2) Screen, Urine neg     THC Screen, Urine neg     Tramadol Scrn, Ur neg     Tricyclic Antidepressants, Urine neg              Amador Mayfield MD

## 2025-03-11 ENCOUNTER — OFFICE VISIT (OUTPATIENT)
Dept: PRIMARY CARE CLINIC | Age: 45
End: 2025-03-11
Payer: COMMERCIAL

## 2025-03-11 VITALS
SYSTOLIC BLOOD PRESSURE: 135 MMHG | OXYGEN SATURATION: 98 % | HEIGHT: 63 IN | HEART RATE: 105 BPM | DIASTOLIC BLOOD PRESSURE: 90 MMHG | BODY MASS INDEX: 29.06 KG/M2 | WEIGHT: 164 LBS

## 2025-03-11 DIAGNOSIS — F32.0 CURRENT MILD EPISODE OF MAJOR DEPRESSIVE DISORDER WITHOUT PRIOR EPISODE: Primary | Chronic | ICD-10-CM

## 2025-03-11 DIAGNOSIS — R05.9 COUGH, UNSPECIFIED TYPE: ICD-10-CM

## 2025-03-11 DIAGNOSIS — Z21 ASYMPTOMATIC HIV INFECTION, WITH NO HISTORY OF HIV-RELATED ILLNESS (HCC): Chronic | ICD-10-CM

## 2025-03-11 DIAGNOSIS — H57.9 ITCHY EYES: ICD-10-CM

## 2025-03-11 DIAGNOSIS — K21.9 GASTROESOPHAGEAL REFLUX DISEASE WITHOUT ESOPHAGITIS: Chronic | ICD-10-CM

## 2025-03-11 DIAGNOSIS — F90.2 ATTENTION DEFICIT HYPERACTIVITY DISORDER (ADHD), COMBINED TYPE: Chronic | ICD-10-CM

## 2025-03-11 LAB
ALCOHOL URINE: ABNORMAL
AMPHETAMINE SCREEN URINE: POSITIVE
BARBITURATE SCREEN URINE: ABNORMAL
BENZODIAZEPINE SCREEN, URINE: ABNORMAL
BUPRENORPHINE URINE: ABNORMAL
COCAINE METABOLITE SCREEN URINE: ABNORMAL
FENTANYL SCREEN, URINE: ABNORMAL
GABAPENTIN SCREEN, URINE: ABNORMAL
INFLUENZA A ANTIGEN, POC: NEGATIVE
INFLUENZA B ANTIGEN, POC: NEGATIVE
LOT EXPIRE DATE: NORMAL
LOT KIT NUMBER: NORMAL
MDMA, URINE: ABNORMAL
METHADONE SCREEN, URINE: ABNORMAL
METHAMPHETAMINE, URINE: ABNORMAL
OPIATE SCREEN URINE: ABNORMAL
OXYCODONE SCREEN URINE: ABNORMAL
PHENCYCLIDINE SCREEN URINE: ABNORMAL
PROPOXYPHENE SCREEN, URINE: ABNORMAL
SARS-COV-2, POC: NORMAL
SYNTHETIC CANNABINOIDS(K2) SCREEN, URINE: ABNORMAL
THC SCREEN, URINE: ABNORMAL
TRAMADOL SCREEN URINE: ABNORMAL
TRICYCLIC ANTIDEPRESSANTS, UR: ABNORMAL
VALID INTERNAL CONTROL: NORMAL
VENDOR AND KIT NAME POC: NORMAL

## 2025-03-11 PROCEDURE — 80305 DRUG TEST PRSMV DIR OPT OBS: CPT | Performed by: FAMILY MEDICINE

## 2025-03-11 PROCEDURE — 99204 OFFICE O/P NEW MOD 45 MIN: CPT | Performed by: FAMILY MEDICINE

## 2025-03-11 PROCEDURE — 87428 SARSCOV & INF VIR A&B AG IA: CPT | Performed by: FAMILY MEDICINE

## 2025-03-11 PROCEDURE — 96372 THER/PROPH/DIAG INJ SC/IM: CPT | Performed by: FAMILY MEDICINE

## 2025-03-11 RX ORDER — PANTOPRAZOLE SODIUM 40 MG/1
TABLET, DELAYED RELEASE ORAL
COMMUNITY
Start: 2023-03-10

## 2025-03-11 RX ORDER — ELVITEGRAVIR, COBICISTAT, EMTRICITABINE, AND TENOFOVIR ALAFENAMIDE 150; 150; 200; 10 MG/1; MG/1; MG/1; MG/1
TABLET ORAL
COMMUNITY
Start: 2012-07-10

## 2025-03-11 RX ORDER — ASPIRIN 81 MG/1
81 TABLET ORAL DAILY
COMMUNITY

## 2025-03-11 RX ORDER — CITALOPRAM 40 MG/1
40 TABLET, FILM COATED ORAL DAILY
COMMUNITY
Start: 2004-05-10

## 2025-03-11 RX ORDER — TOBRAMYCIN 3 MG/ML
SOLUTION/ DROPS OPHTHALMIC
COMMUNITY
Start: 2025-01-08

## 2025-03-11 RX ORDER — FAMOTIDINE 20 MG/1
20 TABLET, FILM COATED ORAL 2 TIMES DAILY
COMMUNITY

## 2025-03-11 RX ORDER — DEXAMETHASONE SODIUM PHOSPHATE 10 MG/ML
10 INJECTION, SOLUTION INTRA-ARTICULAR; INTRALESIONAL; INTRAMUSCULAR; INTRAVENOUS; SOFT TISSUE ONCE
Status: COMPLETED | OUTPATIENT
Start: 2025-03-11 | End: 2025-03-11

## 2025-03-11 RX ADMIN — DEXAMETHASONE SODIUM PHOSPHATE 10 MG: 10 INJECTION, SOLUTION INTRA-ARTICULAR; INTRALESIONAL; INTRAMUSCULAR; INTRAVENOUS; SOFT TISSUE at 10:28

## 2025-03-11 SDOH — ECONOMIC STABILITY: FOOD INSECURITY: WITHIN THE PAST 12 MONTHS, YOU WORRIED THAT YOUR FOOD WOULD RUN OUT BEFORE YOU GOT MONEY TO BUY MORE.: NEVER TRUE

## 2025-03-11 SDOH — ECONOMIC STABILITY: FOOD INSECURITY: WITHIN THE PAST 12 MONTHS, THE FOOD YOU BOUGHT JUST DIDN'T LAST AND YOU DIDN'T HAVE MONEY TO GET MORE.: NEVER TRUE

## 2025-03-11 ASSESSMENT — PATIENT HEALTH QUESTIONNAIRE - PHQ9
SUM OF ALL RESPONSES TO PHQ QUESTIONS 1-9: 0
2. FEELING DOWN, DEPRESSED OR HOPELESS: NOT AT ALL
SUM OF ALL RESPONSES TO PHQ QUESTIONS 1-9: 0
1. LITTLE INTEREST OR PLEASURE IN DOING THINGS: NOT AT ALL
SUM OF ALL RESPONSES TO PHQ QUESTIONS 1-9: 0
SUM OF ALL RESPONSES TO PHQ QUESTIONS 1-9: 0

## 2025-03-13 ENCOUNTER — PATIENT MESSAGE (OUTPATIENT)
Dept: PRIMARY CARE CLINIC | Age: 45
End: 2025-03-13

## 2025-03-14 ENCOUNTER — HOSPITAL ENCOUNTER (OUTPATIENT)
Dept: MAMMOGRAPHY | Facility: HOSPITAL | Age: 45
Discharge: HOME | End: 2025-03-14
Payer: COMMERCIAL

## 2025-03-14 DIAGNOSIS — Z12.31 ENCOUNTER FOR SCREENING MAMMOGRAM FOR BREAST CANCER: ICD-10-CM

## 2025-03-14 DIAGNOSIS — N63.21 LUMP IN UPPER OUTER QUADRANT OF LEFT BREAST: ICD-10-CM

## 2025-03-14 RX ORDER — DEXTROAMPHETAMINE SACCHARATE, AMPHETAMINE ASPARTATE MONOHYDRATE, DEXTROAMPHETAMINE SULFATE AND AMPHETAMINE SULFATE 7.5; 7.5; 7.5; 7.5 MG/1; MG/1; MG/1; MG/1
30 CAPSULE, EXTENDED RELEASE ORAL DAILY
Qty: 30 CAPSULE | Refills: 0 | Status: SHIPPED | OUTPATIENT
Start: 2025-03-14 | End: 2025-04-13

## 2025-03-17 LAB
NCCN CRITERIA FLAG: NORMAL
TYRER CUZICK SCORE: 10.5

## 2025-03-18 ENCOUNTER — HOSPITAL ENCOUNTER (OUTPATIENT)
Dept: MAMMOGRAPHY | Facility: HOSPITAL | Age: 45
Discharge: HOME | End: 2025-03-18
Payer: COMMERCIAL

## 2025-03-18 ENCOUNTER — HOSPITAL ENCOUNTER (OUTPATIENT)
Dept: ULTRASOUND IMAGING | Facility: HOSPITAL | Age: 45
Discharge: HOME | End: 2025-03-18
Payer: COMMERCIAL

## 2025-03-18 DIAGNOSIS — N63.21 LUMP IN UPPER OUTER QUADRANT OF LEFT BREAST: ICD-10-CM

## 2025-03-18 PROCEDURE — G0279 TOMOSYNTHESIS, MAMMO: HCPCS

## 2025-03-18 PROCEDURE — 76642 ULTRASOUND BREAST LIMITED: CPT

## 2025-03-18 PROCEDURE — 77066 DX MAMMO INCL CAD BI: CPT

## 2025-03-18 ASSESSMENT — ENCOUNTER SYMPTOMS
COUGH: 0
NAUSEA: 0
CONSTIPATION: 0
SHORTNESS OF BREATH: 0
ABDOMINAL PAIN: 0
DIARRHEA: 0
TROUBLE SWALLOWING: 0
VOMITING: 0
WHEEZING: 0

## 2025-03-26 ENCOUNTER — HOSPITAL ENCOUNTER (OUTPATIENT)
Dept: ULTRASOUND IMAGING | Facility: HOSPITAL | Age: 45
Discharge: HOME | End: 2025-03-26
Payer: COMMERCIAL

## 2025-03-26 ENCOUNTER — HOSPITAL ENCOUNTER (OUTPATIENT)
Dept: MAMMOGRAPHY | Facility: HOSPITAL | Age: 45
Discharge: HOME | End: 2025-03-26
Payer: COMMERCIAL

## 2025-03-26 DIAGNOSIS — R92.8 ABNORMAL FINDING ON BREAST IMAGING: ICD-10-CM

## 2025-03-26 PROCEDURE — A4648 IMPLANTABLE TISSUE MARKER: HCPCS

## 2025-03-26 PROCEDURE — 88305 TISSUE EXAM BY PATHOLOGIST: CPT | Performed by: INTERNAL MEDICINE

## 2025-03-26 RX ORDER — LIDOCAINE HYDROCHLORIDE 10 MG/ML
10 INJECTION, SOLUTION INFILTRATION; PERINEURAL ONCE
Status: ACTIVE | OUTPATIENT
Start: 2025-03-26

## 2025-03-26 RX ORDER — LIDOCAINE HYDROCHLORIDE AND EPINEPHRINE 10; 10 MG/ML; UG/ML
10 INJECTION, SOLUTION INFILTRATION; PERINEURAL ONCE
Status: ACTIVE | OUTPATIENT
Start: 2025-03-26

## 2025-03-27 LAB
CYTO UR: NORMAL
LAB AP CASE REPORT: NORMAL
LAB AP CLINICAL INFORMATION: NORMAL
Lab: NORMAL
PATH REPORT.FINAL DX SPEC: NORMAL
PATH REPORT.GROSS SPEC: NORMAL

## 2025-03-31 ENCOUNTER — RESULTS FOLLOW-UP (OUTPATIENT)
Dept: ULTRASOUND IMAGING | Facility: HOSPITAL | Age: 45
End: 2025-03-31
Payer: COMMERCIAL

## 2025-04-10 DIAGNOSIS — F90.2 ATTENTION DEFICIT HYPERACTIVITY DISORDER (ADHD), COMBINED TYPE: Chronic | ICD-10-CM

## 2025-04-10 NOTE — TELEPHONE ENCOUNTER
Farhana Cody called to request a refill on her medication.Patient is calling for a refill on the Adderall XR 30 mg, she will be out soon.      Last office visit : 3/11/2025   Next office visit : 6/12/2025     Last UDS: 03-  Benzodiazepine Screen, Urine   Date Value Ref Range Status   03/11/2025 neg  Final     Buprenorphine Urine   Date Value Ref Range Status   03/11/2025 neg  Final     Cocaine Metabolite Screen, Urine   Date Value Ref Range Status   03/11/2025 neg  Final     Gabapentin Screen, Urine   Date Value Ref Range Status   03/11/2025 neg  Final     Oxycodone Screen, Ur   Date Value Ref Range Status   03/11/2025 neg  Final     Propoxyphene Screen, Urine   Date Value Ref Range Status   03/11/2025 neg  Final     THC Screen, Urine   Date Value Ref Range Status   03/11/2025 neg  Final     Tricyclic Antidepressants, Urine   Date Value Ref Range Status   03/11/2025 neg  Final       Last Venkatesh: not on file.  Medication Contract: 03-   Last Fill: 03-    Requested Prescriptions     Pending Prescriptions Disp Refills    amphetamine-dextroamphetamine (ADDERALL XR) 30 MG extended release capsule 30 capsule 0     Sig: Take 1 capsule by mouth daily for 30 days. Max Daily Amount: 30 mg         Please approve or refuse this medication.   Lele Edyd MA

## 2025-04-11 RX ORDER — DEXTROAMPHETAMINE SACCHARATE, AMPHETAMINE ASPARTATE MONOHYDRATE, DEXTROAMPHETAMINE SULFATE AND AMPHETAMINE SULFATE 7.5; 7.5; 7.5; 7.5 MG/1; MG/1; MG/1; MG/1
30 CAPSULE, EXTENDED RELEASE ORAL DAILY
Qty: 30 CAPSULE | Refills: 0 | Status: SHIPPED | OUTPATIENT
Start: 2025-04-13 | End: 2025-05-13

## 2025-05-05 DIAGNOSIS — F90.2 ATTENTION DEFICIT HYPERACTIVITY DISORDER (ADHD), COMBINED TYPE: Chronic | ICD-10-CM

## 2025-05-05 RX ORDER — DEXTROAMPHETAMINE SACCHARATE, AMPHETAMINE ASPARTATE MONOHYDRATE, DEXTROAMPHETAMINE SULFATE AND AMPHETAMINE SULFATE 7.5; 7.5; 7.5; 7.5 MG/1; MG/1; MG/1; MG/1
30 CAPSULE, EXTENDED RELEASE ORAL DAILY
Qty: 30 CAPSULE | Refills: 0 | Status: SHIPPED | OUTPATIENT
Start: 2025-05-13 | End: 2025-06-12

## 2025-05-05 NOTE — TELEPHONE ENCOUNTER
Farhana THOMAS Cody called to request a refill on her medication.      Last office visit : 3/11/2025   Next office visit : 6/12/2025     Last UDS:   Benzodiazepine Screen, Urine   Date Value Ref Range Status   03/11/2025 neg  Final     Buprenorphine Urine   Date Value Ref Range Status   03/11/2025 neg  Final     Cocaine Metabolite Screen, Urine   Date Value Ref Range Status   03/11/2025 neg  Final     Gabapentin Screen, Urine   Date Value Ref Range Status   03/11/2025 neg  Final     Oxycodone Screen, Ur   Date Value Ref Range Status   03/11/2025 neg  Final     Propoxyphene Screen, Urine   Date Value Ref Range Status   03/11/2025 neg  Final     THC Screen, Urine   Date Value Ref Range Status   03/11/2025 neg  Final     Tricyclic Antidepressants, Urine   Date Value Ref Range Status   03/11/2025 neg  Final       Last Venkatesh: 5-5-25  Medication Contract: 3-11-25   Last Fill: 4-13-25    Requested Prescriptions     Pending Prescriptions Disp Refills    amphetamine-dextroamphetamine (ADDERALL XR) 30 MG extended release capsule 30 capsule 0     Sig: Take 1 capsule by mouth daily for 30 days. Max Daily Amount: 30 mg                   Please approve or refuse this medication.   Abida Hunt LPN

## 2025-05-28 ENCOUNTER — TELEPHONE (OUTPATIENT)
Dept: PRIMARY CARE CLINIC | Age: 45
End: 2025-05-28

## 2025-06-06 DIAGNOSIS — F90.2 ATTENTION DEFICIT HYPERACTIVITY DISORDER (ADHD), COMBINED TYPE: Chronic | ICD-10-CM

## 2025-06-06 NOTE — TELEPHONE ENCOUNTER
Farhana THOMAS Cody called to request a refill on her medication.      Last office visit : 3/11/2025   Next office visit : 6/16/2025     Last Venkatesh: 06/11/25  Medication Contract: 03/11/25   Last UDS: 03/11/25  Last Rx: 05/13/25              Benzodiazepine Screen, Urine   Date Value Ref Range Status   03/11/2025 neg  Final     Buprenorphine Urine   Date Value Ref Range Status   03/11/2025 neg  Final     Cocaine Metabolite Screen, Urine   Date Value Ref Range Status   03/11/2025 neg  Final     Gabapentin Screen, Urine   Date Value Ref Range Status   03/11/2025 neg  Final     Oxycodone Screen, Ur   Date Value Ref Range Status   03/11/2025 neg  Final     Propoxyphene Screen, Urine   Date Value Ref Range Status   03/11/2025 neg  Final     THC Screen, Urine   Date Value Ref Range Status   03/11/2025 neg  Final     Tricyclic Antidepressants, Urine   Date Value Ref Range Status   03/11/2025 neg  Final           Requested Prescriptions     Pending Prescriptions Disp Refills    amphetamine-dextroamphetamine (ADDERALL XR) 30 MG extended release capsule 30 capsule 0     Sig: Take 1 capsule by mouth daily for 30 days. Max Daily Amount: 30 mg         Please approve or refuse this medication.   Bailey Soni MA

## 2025-06-11 RX ORDER — DEXTROAMPHETAMINE SACCHARATE, AMPHETAMINE ASPARTATE MONOHYDRATE, DEXTROAMPHETAMINE SULFATE AND AMPHETAMINE SULFATE 7.5; 7.5; 7.5; 7.5 MG/1; MG/1; MG/1; MG/1
30 CAPSULE, EXTENDED RELEASE ORAL DAILY
Qty: 7 CAPSULE | Refills: 0 | Status: SHIPPED | OUTPATIENT
Start: 2025-06-12 | End: 2025-06-19

## 2025-06-15 SDOH — HEALTH STABILITY: PHYSICAL HEALTH: ON AVERAGE, HOW MANY DAYS PER WEEK DO YOU ENGAGE IN MODERATE TO STRENUOUS EXERCISE (LIKE A BRISK WALK)?: 0 DAYS

## 2025-06-15 SDOH — HEALTH STABILITY: PHYSICAL HEALTH: ON AVERAGE, HOW MANY MINUTES DO YOU ENGAGE IN EXERCISE AT THIS LEVEL?: 0 MIN

## 2025-06-16 ENCOUNTER — OFFICE VISIT (OUTPATIENT)
Dept: PRIMARY CARE CLINIC | Age: 45
End: 2025-06-16
Payer: COMMERCIAL

## 2025-06-16 VITALS
BODY MASS INDEX: 29.38 KG/M2 | HEART RATE: 80 BPM | HEIGHT: 63 IN | WEIGHT: 165.8 LBS | OXYGEN SATURATION: 98 % | SYSTOLIC BLOOD PRESSURE: 124 MMHG | DIASTOLIC BLOOD PRESSURE: 84 MMHG | TEMPERATURE: 97.2 F

## 2025-06-16 DIAGNOSIS — F90.2 ATTENTION DEFICIT HYPERACTIVITY DISORDER (ADHD), COMBINED TYPE: ICD-10-CM

## 2025-06-16 DIAGNOSIS — F90.2 ATTENTION DEFICIT HYPERACTIVITY DISORDER (ADHD), COMBINED TYPE: Chronic | ICD-10-CM

## 2025-06-16 DIAGNOSIS — Z12.31 ENCOUNTER FOR SCREENING MAMMOGRAM FOR BREAST CANCER: ICD-10-CM

## 2025-06-16 DIAGNOSIS — Z79.899 DRUG THERAPY: Primary | ICD-10-CM

## 2025-06-16 LAB
ALCOHOL URINE: ABNORMAL
AMPHETAMINE SCREEN URINE: POSITIVE
BARBITURATE SCREEN URINE: ABNORMAL
BENZODIAZEPINE SCREEN, URINE: ABNORMAL
BUPRENORPHINE URINE: ABNORMAL
COCAINE METABOLITE SCREEN URINE: ABNORMAL
FENTANYL SCREEN, URINE: ABNORMAL
GABAPENTIN SCREEN, URINE: ABNORMAL
MDMA, URINE: ABNORMAL
METHADONE SCREEN, URINE: ABNORMAL
METHAMPHETAMINE, URINE: ABNORMAL
OPIATE SCREEN URINE: ABNORMAL
OXYCODONE SCREEN URINE: ABNORMAL
PHENCYCLIDINE SCREEN URINE: ABNORMAL
PROPOXYPHENE SCREEN, URINE: ABNORMAL
SYNTHETIC CANNABINOIDS(K2) SCREEN, URINE: ABNORMAL
THC SCREEN, URINE: ABNORMAL
TRAMADOL SCREEN URINE: ABNORMAL
TRICYCLIC ANTIDEPRESSANTS, UR: ABNORMAL

## 2025-06-16 PROCEDURE — 99215 OFFICE O/P EST HI 40 MIN: CPT | Performed by: NURSE PRACTITIONER

## 2025-06-16 PROCEDURE — 80305 DRUG TEST PRSMV DIR OPT OBS: CPT | Performed by: NURSE PRACTITIONER

## 2025-06-16 RX ORDER — DEXTROAMPHETAMINE SACCHARATE, AMPHETAMINE ASPARTATE MONOHYDRATE, DEXTROAMPHETAMINE SULFATE AND AMPHETAMINE SULFATE 7.5; 7.5; 7.5; 7.5 MG/1; MG/1; MG/1; MG/1
30 CAPSULE, EXTENDED RELEASE ORAL DAILY
Qty: 30 CAPSULE | Refills: 0 | Status: SHIPPED | OUTPATIENT
Start: 2025-06-16 | End: 2025-07-16

## 2025-06-16 ASSESSMENT — ENCOUNTER SYMPTOMS
VOICE CHANGE: 0
PHOTOPHOBIA: 0
SHORTNESS OF BREATH: 0
NAUSEA: 0
VOMITING: 0
COUGH: 0
BACK PAIN: 0
RHINORRHEA: 0
COLOR CHANGE: 0

## 2025-06-16 NOTE — PROGRESS NOTES
[facility]  [address]  [address]  [place] [address]  Dept: [phone]  Dept Fax: [phone]  Loc: [phone]    Farhana Cody is a 45 y.o. female who presents today for her medical conditions/complaints as noted below.  Farhana Cody is c/o of Follow-up (Pt is wanting something for weight loss. )        HPI:     History of Present Illness  The patient presents for ADHD, HIV management, weight concerns, and follow-up regarding a breast cyst.    They were previously under the care of Dr. Mayfield, with their last consultation occurring three months ago. They have been on Adderall since childhood, which was recently reintroduced three months ago. They express a preference for immediate release over extended release formulations. They are currently taking Adderall 30 mg XR.    They have been diagnosed with HIV, contracted from their partner who was released from senior living. They are under the care of Dr. Kraft for this condition. Their HIV status is undetectable, and they are on Genvoya, which they take consistently without missing doses.    They underwent a hysterectomy ten years ago and have been struggling with weight management since then. Their weight has increased from their usual range of 140 to 145 pounds to 164 pounds at their last visit. They are uncertain if this weight gain is hormone-related. They are interested in exploring weight loss medications.    They had a mammogram in 03/2025. A follow-up appointment is scheduled for 10/2025.    They have been on Celexa for the past 21 years following the loss of their child.    PAST SURGICAL HISTORY:  Hysterectomy (10 years ago)    FAMILY HISTORY  The patient mentions that breast cysts run in their family.       History reviewed. No pertinent past medical history.   History reviewed. No pertinent surgical history.        6/16/2025    10:54 AM 3/11/2025     8:36 AM   Vitals   SYSTOLIC 124 135

## 2025-06-16 NOTE — TELEPHONE ENCOUNTER
PDMP Monitoring:    Last PDMP Vinayak as Reviewed:  Review User Review Instant Review Result   MACK MARTIN 6/16/2025 11:19 AM Reviewed PDMP [1]     [unfilled]  Urine Drug Screenings (1 yr)       POCT Rapid Drug Screen  Collected: 3/11/2025 10:26 AM (Final result)                  Medication Contract and Consent for Opioid Use Documents Filed       Patient Documents       Type of Document Status Date Received Received By Description    Medication Contract Received 3/11/2025 10:31 AM PARKER SWAIN

## 2025-07-15 ENCOUNTER — TELEPHONE (OUTPATIENT)
Dept: PRIMARY CARE CLINIC | Age: 45
End: 2025-07-15

## 2025-07-15 DIAGNOSIS — F90.2 ATTENTION DEFICIT HYPERACTIVITY DISORDER (ADHD), COMBINED TYPE: Chronic | ICD-10-CM

## 2025-07-15 RX ORDER — DEXTROAMPHETAMINE SACCHARATE, AMPHETAMINE ASPARTATE MONOHYDRATE, DEXTROAMPHETAMINE SULFATE AND AMPHETAMINE SULFATE 7.5; 7.5; 7.5; 7.5 MG/1; MG/1; MG/1; MG/1
30 CAPSULE, EXTENDED RELEASE ORAL DAILY
Qty: 30 CAPSULE | Refills: 0 | Status: CANCELLED | OUTPATIENT
Start: 2025-07-16 | End: 2025-08-15

## 2025-07-15 NOTE — TELEPHONE ENCOUNTER
I took the RF for Adderall out of the message that Lele sent because we found that the pt is getting another ADHD med from another provider  Sorry for the confusion.

## 2025-07-15 NOTE — TELEPHONE ENCOUNTER
Provider needs to review PDMP  Patient is requesting Adderall XR 30 mg.  PDMP Monitoring:    Last PDMP Vinayak as Reviewed ([place]):  Review User Review Instant Review Result   MACK MARTIN 6/16/2025 11:19 AM Reviewed PDMP [1]     Last filled date:06-   Last office visit for requested medication : 06-    Next office visit : 9/16/2025   Venkatesh: 07-   Last UDS:06-   Benzodiazepine Screen, Urine   Date Value Ref Range Status   06/16/2025 Neg  Final     Buprenorphine Urine   Date Value Ref Range Status   06/16/2025 Neg  Final     Cocaine Metabolite Screen, Urine   Date Value Ref Range Status   06/16/2025 Neg  Final     Gabapentin Screen, Urine   Date Value Ref Range Status   03/11/2025 neg  Final     Oxycodone Screen, Ur   Date Value Ref Range Status   06/16/2025 Neg  Final     Propoxyphene Screen, Urine   Date Value Ref Range Status   06/16/2025 Neg  Final     THC Screen, Urine   Date Value Ref Range Status   06/16/2025 Neg  Final     Tricyclic Antidepressants, Urine   Date Value Ref Range Status   06/16/2025 Neg  Final       Medication Contract and Consent for Opioid Use Documents Filed       Patient Documents       Type of Document Status Date Received Received By Description    Medication Contract Received 3/11/2025 10:31 AM PARKER SWAIN     Medication Contract Received 6/24/2025  5:02 PM JOANA OVALLES medication contract                    Requested Prescriptions     Pending Prescriptions Disp Refills    amphetamine-dextroamphetamine (ADDERALL XR) 30 MG extended release capsule 30 capsule 0     Sig: Take 1 capsule by mouth daily for 30 days. Max Daily Amount: 30 mg                   Please approve or refuse this medication.   Lele Eddy MA

## 2025-07-16 NOTE — TELEPHONE ENCOUNTER
I called and left message for a return call.     The patient gets the Lisdexamfetamine Dimesyla 50 mg from [facility]. Patient gets Adderall XR 30 mg from [facility]/ [facility].      The patient requested a refill on the Adderall XR 30 mg on 07-.     She also requested that I call her insurance to ask what weight loss medication they will pay for.    I informed the patient that she will have the call her insurance and ask them if they have a weight loss program and what medications they will pay for and then to contact the office with those names of medications.    After reviewing her chart, patient's plan does not have weight loss benefits & they are not covered by her plan.

## 2025-07-18 ENCOUNTER — TELEPHONE (OUTPATIENT)
Dept: PRIMARY CARE CLINIC | Age: 45
End: 2025-07-18

## 2025-07-18 NOTE — TELEPHONE ENCOUNTER
The patient returned my call today 07-, she reports that she started a new job, she can not answer her phone. When she returned the call ,we were closed.    We spoke about the adderall and the Vyvanse. I informed her that the raimundo I ran had Destiny Cristobal who wrote for the Vyvanse . Patient said \"I see another provider, this provider did a Gene sight in her office.\" Patient reports that \"I do not need the Adderall XR 30 mg.\" I informed her that she had called our office on 07- requesting a refill on the adderall  XR 30 mg and she said \"I thought I called the other provider.\" The patient said \"I would not take both, Adderall and Vyvanse.\"      There was a surescript sent on 07- to Dr. Lo Yung for the Adderall XR 30 mg. I denied the script  on 07-.